# Patient Record
Sex: MALE | Race: BLACK OR AFRICAN AMERICAN | NOT HISPANIC OR LATINO | Employment: OTHER | URBAN - METROPOLITAN AREA
[De-identification: names, ages, dates, MRNs, and addresses within clinical notes are randomized per-mention and may not be internally consistent; named-entity substitution may affect disease eponyms.]

---

## 2023-03-23 DIAGNOSIS — E78.5 HYPERLIPIDEMIA, UNSPECIFIED: ICD-10-CM

## 2023-03-27 DIAGNOSIS — I10 ESSENTIAL (PRIMARY) HYPERTENSION: ICD-10-CM

## 2023-03-27 DIAGNOSIS — E11.9 TYPE 2 DIABETES MELLITUS WITHOUT COMPLICATIONS (MULTI): ICD-10-CM

## 2023-04-05 PROBLEM — J84.10 PULMONARY FIBROSIS (MULTI): Status: ACTIVE | Noted: 2023-04-05

## 2023-04-05 PROBLEM — E11.9 TYPE 2 DIABETES MELLITUS (MULTI): Status: ACTIVE | Noted: 2023-04-05

## 2023-04-05 PROBLEM — J84.9 ILD (INTERSTITIAL LUNG DISEASE) (MULTI): Status: ACTIVE | Noted: 2023-04-05

## 2023-04-05 PROBLEM — E66.01 MORBID OBESITY (MULTI): Status: ACTIVE | Noted: 2023-04-05

## 2023-04-05 PROBLEM — H52.01 AXIAL HYPERMETROPIA OF RIGHT EYE: Status: ACTIVE | Noted: 2023-04-05

## 2023-04-05 PROBLEM — G47.33 OSA (OBSTRUCTIVE SLEEP APNEA): Status: ACTIVE | Noted: 2023-04-05

## 2023-04-05 PROBLEM — R93.89 ABNORMAL CHEST XRAY: Status: ACTIVE | Noted: 2023-04-05

## 2023-04-05 PROBLEM — N52.9 ERECTILE DYSFUNCTION: Status: ACTIVE | Noted: 2023-04-05

## 2023-04-05 PROBLEM — E78.5 DYSLIPIDEMIA: Status: ACTIVE | Noted: 2023-04-05

## 2023-04-05 PROBLEM — K21.9 ESOPHAGEAL REFLUX: Status: ACTIVE | Noted: 2023-04-05

## 2023-04-05 PROBLEM — H52.13 MYOPIA, BILATERAL: Status: ACTIVE | Noted: 2023-04-05

## 2023-04-05 PROBLEM — J98.4 CYSTIC-BULLOUS DISEASE OF LUNG: Status: ACTIVE | Noted: 2023-04-05

## 2023-04-05 PROBLEM — E55.9 VITAMIN D DEFICIENCY: Status: ACTIVE | Noted: 2023-04-05

## 2023-04-05 PROBLEM — H52.03 HYPEROPIA OF BOTH EYES WITH REGULAR ASTIGMATISM: Status: ACTIVE | Noted: 2023-04-05

## 2023-04-05 PROBLEM — I10 ESSENTIAL HYPERTENSION: Status: ACTIVE | Noted: 2023-04-05

## 2023-04-05 PROBLEM — J44.9 CHRONIC OBSTRUCTIVE PULMONARY DISEASE (MULTI): Status: ACTIVE | Noted: 2023-04-05

## 2023-04-05 PROBLEM — D64.9 ANEMIA: Status: ACTIVE | Noted: 2023-04-05

## 2023-04-05 PROBLEM — H52.223 HYPEROPIA OF BOTH EYES WITH REGULAR ASTIGMATISM: Status: ACTIVE | Noted: 2023-04-05

## 2023-04-05 PROBLEM — M17.0 ARTHRITIS OF BOTH KNEES: Status: ACTIVE | Noted: 2023-04-05

## 2023-04-05 PROBLEM — R93.89 ABNORMAL CHEST CT: Status: ACTIVE | Noted: 2023-04-05

## 2023-04-05 PROBLEM — H53.8 BLURRY VISION, BILATERAL: Status: ACTIVE | Noted: 2023-04-05

## 2023-04-05 RX ORDER — METOPROLOL SUCCINATE 50 MG/1
1 TABLET, EXTENDED RELEASE ORAL DAILY
COMMUNITY
Start: 2021-02-12 | End: 2023-04-20 | Stop reason: SDUPTHER

## 2023-04-05 RX ORDER — ATORVASTATIN CALCIUM 20 MG/1
1 TABLET, FILM COATED ORAL DAILY
COMMUNITY
Start: 2022-10-13 | End: 2023-05-02 | Stop reason: SDUPTHER

## 2023-04-05 RX ORDER — TRIAMTERENE/HYDROCHLOROTHIAZID 37.5-25 MG
1 TABLET ORAL DAILY
COMMUNITY
Start: 2018-09-27 | End: 2023-04-20 | Stop reason: SDUPTHER

## 2023-04-05 RX ORDER — AMLODIPINE BESYLATE 10 MG/1
1 TABLET ORAL DAILY
COMMUNITY
Start: 2019-11-05 | End: 2023-04-20 | Stop reason: SDUPTHER

## 2023-04-05 RX ORDER — ALBUTEROL SULFATE 90 UG/1
AEROSOL, METERED RESPIRATORY (INHALATION)
COMMUNITY
Start: 2022-10-13

## 2023-04-05 RX ORDER — METFORMIN HYDROCHLORIDE 500 MG/1
1000 TABLET, EXTENDED RELEASE ORAL
COMMUNITY
Start: 2022-10-13 | End: 2023-05-02 | Stop reason: SDUPTHER

## 2023-04-06 ENCOUNTER — APPOINTMENT (OUTPATIENT)
Dept: PRIMARY CARE | Facility: CLINIC | Age: 38
End: 2023-04-06
Payer: COMMERCIAL

## 2023-04-20 ENCOUNTER — OFFICE VISIT (OUTPATIENT)
Dept: PRIMARY CARE | Facility: CLINIC | Age: 38
End: 2023-04-20
Payer: COMMERCIAL

## 2023-04-20 VITALS
RESPIRATION RATE: 16 BRPM | DIASTOLIC BLOOD PRESSURE: 100 MMHG | HEART RATE: 70 BPM | WEIGHT: 315 LBS | TEMPERATURE: 97.7 F | OXYGEN SATURATION: 97 % | HEIGHT: 75 IN | BODY MASS INDEX: 39.17 KG/M2 | SYSTOLIC BLOOD PRESSURE: 162 MMHG

## 2023-04-20 DIAGNOSIS — E11.9 TYPE 2 DIABETES MELLITUS WITHOUT COMPLICATION, WITHOUT LONG-TERM CURRENT USE OF INSULIN (MULTI): ICD-10-CM

## 2023-04-20 DIAGNOSIS — E78.5 DYSLIPIDEMIA: Primary | ICD-10-CM

## 2023-04-20 DIAGNOSIS — I10 ESSENTIAL HYPERTENSION: ICD-10-CM

## 2023-04-20 PROCEDURE — 99214 OFFICE O/P EST MOD 30 MIN: CPT | Performed by: FAMILY MEDICINE

## 2023-04-20 PROCEDURE — 3077F SYST BP >= 140 MM HG: CPT | Performed by: FAMILY MEDICINE

## 2023-04-20 PROCEDURE — 1036F TOBACCO NON-USER: CPT | Performed by: FAMILY MEDICINE

## 2023-04-20 PROCEDURE — 3080F DIAST BP >= 90 MM HG: CPT | Performed by: FAMILY MEDICINE

## 2023-04-20 RX ORDER — AMLODIPINE BESYLATE 10 MG/1
10 TABLET ORAL DAILY
Qty: 90 TABLET | Refills: 0 | Status: SHIPPED | OUTPATIENT
Start: 2023-04-20 | End: 2023-08-10 | Stop reason: SDUPTHER

## 2023-04-20 RX ORDER — METOPROLOL SUCCINATE 50 MG/1
50 TABLET, EXTENDED RELEASE ORAL DAILY
Qty: 90 TABLET | Refills: 0 | Status: SHIPPED | OUTPATIENT
Start: 2023-04-20 | End: 2023-05-11 | Stop reason: DRUGHIGH

## 2023-04-20 RX ORDER — TRIAMTERENE/HYDROCHLOROTHIAZID 37.5-25 MG
1 TABLET ORAL DAILY
Qty: 90 TABLET | Refills: 0 | Status: SHIPPED | OUTPATIENT
Start: 2023-04-20 | End: 2023-08-10 | Stop reason: SDUPTHER

## 2023-04-20 NOTE — PROGRESS NOTES
"Subjective   Patient ID: Boy Ellington is a 37 y.o. male who presents for Med Refill.    HPI   H/O DM, HLD, HTN.  Did not return for labs or office visit as advised  Ran out of meds.  Requests refills.  No acute complaints at this time.     Review of Systems   All other systems reviewed and are negative.  Objective   BP (!) 162/100   Pulse 70   Temp 36.5 °C (97.7 °F)   Resp 16   Ht 1.905 m (6' 3\")   Wt 146 kg (321 lb 6.4 oz)   SpO2 97%   BMI 40.17 kg/m²   Physical Exam  Constitutional:       General: He is not in acute distress.     Appearance: He is obese.   Eyes:      Conjunctiva/sclera: Conjunctivae normal.   Cardiovascular:      Rate and Rhythm: Normal rate and regular rhythm.      Heart sounds: Normal heart sounds. No murmur heard.     No friction rub. No gallop.   Pulmonary:      Effort: Pulmonary effort is normal.      Breath sounds: No wheezing, rhonchi or rales.   Neurological:      General: No focal deficit present.      Mental Status: He is oriented to person, place, and time.   Psychiatric:         Mood and Affect: Mood normal.         Behavior: Behavior normal.     Assessment/Plan   Diagnoses and all orders for this visit:  Dyslipidemia  -     Lipid Panel; Future  Essential hypertension  -     CBC; Future  -     Basic Metabolic Panel; Future  -     amLODIPine (Norvasc) 10 mg tablet; Take 1 tablet (10 mg) by mouth once daily.  -     metoprolol succinate XL (Toprol-XL) 50 mg 24 hr tablet; Take 1 tablet (50 mg) by mouth once daily.  -     triamterene-hydrochlorothiazid (Maxzide-25) 37.5-25 mg tablet; Take 1 tablet by mouth once daily.  Type 2 diabetes mellitus without complication, without long-term current use of insulin (CMS/[facility])  -     Hemoglobin A1C; Future  -     Albumin , Urine Random; Future    Labs re-ordered.  Refilled blood pressure meds.  Will refill other meds after reviewing lab results.    F/U 2 weeks: BP check, Review lab results, Consider nephrology referral if microalbuminuria still " present.

## 2023-04-20 NOTE — PATIENT INSTRUCTIONS
Labs re-ordered.  Refilled blood pressure meds.  Will refill other meds after reviewing lab results.    F/U 2 weeks: BP check, Review lab results.

## 2023-05-01 ENCOUNTER — LAB (OUTPATIENT)
Dept: LAB | Facility: LAB | Age: 38
End: 2023-05-01
Payer: COMMERCIAL

## 2023-05-01 DIAGNOSIS — E11.9 TYPE 2 DIABETES MELLITUS WITHOUT COMPLICATION, WITHOUT LONG-TERM CURRENT USE OF INSULIN (MULTI): ICD-10-CM

## 2023-05-01 DIAGNOSIS — I10 ESSENTIAL HYPERTENSION: ICD-10-CM

## 2023-05-01 DIAGNOSIS — E78.5 DYSLIPIDEMIA: ICD-10-CM

## 2023-05-01 LAB
AMPHETAMINE (PRESENCE) IN URINE BY SCREEN METHOD: NORMAL
BARBITURATES PRESENCE IN URINE BY SCREEN METHOD: NORMAL
BENZODIAZEPINE (PRESENCE) IN URINE BY SCREEN METHOD: NORMAL
CANNABINOIDS IN URINE BY SCREEN METHOD: NORMAL
COCAINE (PRESENCE) IN URINE BY SCREEN METHOD: NORMAL
DRUG SCREEN COMMENT URINE: NORMAL
FENTANYL URINE: NORMAL
HIV 1/ 2 AG/AB SCREEN: NONREACTIVE
IGG (MG/DL) IN SER/PLAS: 1330 MG/DL (ref 700–1600)
METHADONE (PRESENCE) IN URINE BY SCREEN METHOD: NORMAL
OPIATES (PRESENCE) IN URINE BY SCREEN METHOD: NORMAL
OXYCODONE (PRESENCE) IN URINE BY SCREEN METHOD: NORMAL
PHENCYCLIDINE (PRESENCE) IN URINE BY SCREEN METHOD: NORMAL
SWEAT CHLORIDE, LEFT ARM: NORMAL
SWEAT CHLORIDE, RIGHT ARM: NORMAL

## 2023-05-01 PROCEDURE — 83036 HEMOGLOBIN GLYCOSYLATED A1C: CPT

## 2023-05-01 PROCEDURE — 36415 COLL VENOUS BLD VENIPUNCTURE: CPT

## 2023-05-01 PROCEDURE — 85027 COMPLETE CBC AUTOMATED: CPT

## 2023-05-01 PROCEDURE — 80061 LIPID PANEL: CPT

## 2023-05-01 PROCEDURE — 80048 BASIC METABOLIC PNL TOTAL CA: CPT

## 2023-05-02 DIAGNOSIS — R80.9 TYPE 2 DIABETES MELLITUS WITH MICROALBUMINURIA, WITHOUT LONG-TERM CURRENT USE OF INSULIN (MULTI): Primary | ICD-10-CM

## 2023-05-02 DIAGNOSIS — E11.29 TYPE 2 DIABETES MELLITUS WITH MICROALBUMINURIA, WITHOUT LONG-TERM CURRENT USE OF INSULIN (MULTI): Primary | ICD-10-CM

## 2023-05-02 LAB
ANION GAP IN SER/PLAS: 13 MMOL/L (ref 10–20)
CALCIUM (MG/DL) IN SER/PLAS: 10 MG/DL (ref 8.6–10.6)
CARBON DIOXIDE, TOTAL (MMOL/L) IN SER/PLAS: 31 MMOL/L (ref 21–32)
CHLORIDE (MMOL/L) IN SER/PLAS: 96 MMOL/L (ref 98–107)
CHOLESTEROL (MG/DL) IN SER/PLAS: 137 MG/DL (ref 0–199)
CHOLESTEROL IN HDL (MG/DL) IN SER/PLAS: 42.1 MG/DL
CHOLESTEROL/HDL RATIO: 3.3
CREATININE (MG/DL) IN SER/PLAS: 0.98 MG/DL (ref 0.5–1.3)
ERYTHROCYTE DISTRIBUTION WIDTH (RATIO) BY AUTOMATED COUNT: 11.8 % (ref 11.5–14.5)
ERYTHROCYTE MEAN CORPUSCULAR HEMOGLOBIN CONCENTRATION (G/DL) BY AUTOMATED: 35.2 G/DL (ref 32–36)
ERYTHROCYTE MEAN CORPUSCULAR VOLUME (FL) BY AUTOMATED COUNT: 84 FL (ref 80–100)
ERYTHROCYTES (10*6/UL) IN BLOOD BY AUTOMATED COUNT: 4.69 X10E12/L (ref 4.5–5.9)
ESTIMATED AVERAGE GLUCOSE FOR HBA1C: 303 MG/DL
GFR MALE: >90 ML/MIN/1.73M2
GLUCOSE (MG/DL) IN SER/PLAS: 253 MG/DL (ref 74–99)
HEMATOCRIT (%) IN BLOOD BY AUTOMATED COUNT: 39.2 % (ref 41–52)
HEMOGLOBIN (G/DL) IN BLOOD: 13.8 G/DL (ref 13.5–17.5)
HEMOGLOBIN A1C/HEMOGLOBIN TOTAL IN BLOOD: 12.2 %
LDL: 80 MG/DL (ref 0–99)
LEUKOCYTES (10*3/UL) IN BLOOD BY AUTOMATED COUNT: 11 X10E9/L (ref 4.4–11.3)
NRBC (PER 100 WBCS) BY AUTOMATED COUNT: 0 /100 WBC (ref 0–0)
PLATELETS (10*3/UL) IN BLOOD AUTOMATED COUNT: 283 X10E9/L (ref 150–450)
POTASSIUM (MMOL/L) IN SER/PLAS: 4.1 MMOL/L (ref 3.5–5.3)
SODIUM (MMOL/L) IN SER/PLAS: 136 MMOL/L (ref 136–145)
TRIGLYCERIDE (MG/DL) IN SER/PLAS: 74 MG/DL (ref 0–149)
UREA NITROGEN (MG/DL) IN SER/PLAS: 13 MG/DL (ref 6–23)
VLDL: 15 MG/DL (ref 0–40)

## 2023-05-02 RX ORDER — METFORMIN HYDROCHLORIDE 500 MG/1
1000 TABLET, EXTENDED RELEASE ORAL
Qty: 180 TABLET | Refills: 0 | Status: SHIPPED | OUTPATIENT
Start: 2023-05-02 | End: 2023-08-13 | Stop reason: SDUPTHER

## 2023-05-02 RX ORDER — ATORVASTATIN CALCIUM 20 MG/1
20 TABLET, FILM COATED ORAL DAILY
Qty: 90 TABLET | Refills: 0 | Status: SHIPPED | OUTPATIENT
Start: 2023-05-02 | End: 2023-08-10 | Stop reason: SDUPTHER

## 2023-05-03 PROBLEM — D72.829 LEUKOCYTOSIS: Status: ACTIVE | Noted: 2023-05-03

## 2023-05-03 PROBLEM — E87.5 HYPERKALEMIA: Status: ACTIVE | Noted: 2023-05-03

## 2023-05-03 RX ORDER — IBUPROFEN 600 MG/1
600 TABLET ORAL EVERY 8 HOURS
COMMUNITY
Start: 2017-05-29 | End: 2023-08-10 | Stop reason: ALTCHOICE

## 2023-05-03 RX ORDER — ACETAMINOPHEN 500 MG/1
500 CAPSULE, LIQUID FILLED ORAL EVERY 4 HOURS PRN
COMMUNITY
Start: 2013-08-07 | End: 2023-11-16 | Stop reason: ALTCHOICE

## 2023-05-03 RX ORDER — DESONIDE 0.5 MG/G
OINTMENT TOPICAL 2 TIMES DAILY
COMMUNITY
Start: 2023-01-26 | End: 2023-11-16 | Stop reason: ALTCHOICE

## 2023-05-04 ENCOUNTER — APPOINTMENT (OUTPATIENT)
Dept: PRIMARY CARE | Facility: CLINIC | Age: 38
End: 2023-05-04
Payer: COMMERCIAL

## 2023-05-05 RX ORDER — ATORVASTATIN CALCIUM 20 MG/1
TABLET, FILM COATED ORAL
Qty: 30 TABLET | Refills: 2 | OUTPATIENT
Start: 2023-05-05

## 2023-05-05 RX ORDER — AMLODIPINE BESYLATE 10 MG/1
TABLET ORAL
Qty: 30 TABLET | OUTPATIENT
Start: 2023-05-05

## 2023-05-05 RX ORDER — METFORMIN HYDROCHLORIDE 500 MG/1
TABLET, EXTENDED RELEASE ORAL
Qty: 60 TABLET | OUTPATIENT
Start: 2023-05-05

## 2023-05-05 RX ORDER — METOPROLOL SUCCINATE 50 MG/1
TABLET, EXTENDED RELEASE ORAL
Qty: 30 TABLET | OUTPATIENT
Start: 2023-05-05

## 2023-05-05 RX ORDER — TRIAMTERENE/HYDROCHLOROTHIAZID 37.5-25 MG
TABLET ORAL
Qty: 30 TABLET | OUTPATIENT
Start: 2023-05-05

## 2023-05-08 LAB — Lab: 37 PG/ML (ref 9–86)

## 2023-05-11 ENCOUNTER — OFFICE VISIT (OUTPATIENT)
Dept: PRIMARY CARE | Facility: CLINIC | Age: 38
End: 2023-05-11
Payer: COMMERCIAL

## 2023-05-11 VITALS
WEIGHT: 313.6 LBS | BODY MASS INDEX: 38.99 KG/M2 | OXYGEN SATURATION: 96 % | HEIGHT: 75 IN | RESPIRATION RATE: 16 BRPM | SYSTOLIC BLOOD PRESSURE: 152 MMHG | DIASTOLIC BLOOD PRESSURE: 90 MMHG | HEART RATE: 73 BPM

## 2023-05-11 DIAGNOSIS — E11.29 TYPE 2 DIABETES MELLITUS WITH MICROALBUMINURIA, WITHOUT LONG-TERM CURRENT USE OF INSULIN (MULTI): ICD-10-CM

## 2023-05-11 DIAGNOSIS — E78.5 DYSLIPIDEMIA: ICD-10-CM

## 2023-05-11 DIAGNOSIS — I10 PRIMARY HYPERTENSION: Primary | ICD-10-CM

## 2023-05-11 DIAGNOSIS — R80.9 TYPE 2 DIABETES MELLITUS WITH MICROALBUMINURIA, WITHOUT LONG-TERM CURRENT USE OF INSULIN (MULTI): ICD-10-CM

## 2023-05-11 DIAGNOSIS — I10 ESSENTIAL HYPERTENSION: ICD-10-CM

## 2023-05-11 PROCEDURE — 1036F TOBACCO NON-USER: CPT | Performed by: FAMILY MEDICINE

## 2023-05-11 PROCEDURE — 3046F HEMOGLOBIN A1C LEVEL >9.0%: CPT | Performed by: FAMILY MEDICINE

## 2023-05-11 PROCEDURE — 3077F SYST BP >= 140 MM HG: CPT | Performed by: FAMILY MEDICINE

## 2023-05-11 PROCEDURE — 99214 OFFICE O/P EST MOD 30 MIN: CPT | Performed by: FAMILY MEDICINE

## 2023-05-11 PROCEDURE — 3080F DIAST BP >= 90 MM HG: CPT | Performed by: FAMILY MEDICINE

## 2023-05-11 RX ORDER — METOPROLOL SUCCINATE 50 MG/1
100 TABLET, EXTENDED RELEASE ORAL DAILY
Qty: 1 TABLET | Refills: 0 | Status: SHIPPED | OUTPATIENT
Start: 2023-05-11 | End: 2023-05-19 | Stop reason: SDUPTHER

## 2023-05-11 NOTE — PROGRESS NOTES
"Subjective   Patient ID: Boy Ellington is a 37 y.o. male who presents for 2 week f/u.    HPI   H/O HTN.  BP elevated at last visit (not taking meds at that time).  Here for BP check.  Taking med(s) as directed since last visit.  Does not need refills at this time.    Labs ordered at last visit.  HbA1c, Lipids not at goal  (not taking meds at that time).  Meds restarted.  Does not need refills at this time.    Of note:  Did not get urine microabumin as ordered.    Review of Systems   All other systems reviewed and are negative.  Objective   /90   Pulse 73   Resp 16   Ht 1.905 m (6' 3\")   Wt 142 kg (313 lb 9.6 oz)   SpO2 96%   BMI 39.20 kg/m²   Physical Exam  Constitutional:       General: He is not in acute distress.     Appearance: He is obese.   Eyes:      Conjunctiva/sclera: Conjunctivae normal.   Cardiovascular:      Rate and Rhythm: Normal rate and regular rhythm.      Heart sounds: Normal heart sounds. No murmur heard.     No friction rub. No gallop.   Pulmonary:      Effort: Pulmonary effort is normal.      Breath sounds: Normal breath sounds. No wheezing, rhonchi or rales.   Skin:     Coloration: Skin is not jaundiced or pale.   Neurological:      Mental Status: He is oriented to person, place, and time.   Psychiatric:         Mood and Affect: Mood normal.         Behavior: Behavior normal.     Assessment/Plan   Diagnoses and all orders for this visit:  Primary hypertension  Type 2 diabetes mellitus with microalbuminuria, without long-term current use of insulin (CMS/[facility])  Dyslipidemia  Essential hypertension  -     metoprolol succinate XL (Toprol-XL) 50 mg 24 hr tablet; Take 2 tablets (100 mg) by mouth once daily.    Have urine microalbumin done as previously ordered (will consider nephrology referral if microalbuminuria still present).    Increase Metoprolol to 2 tabs each morning.  Continue other current medications.    Hypertension recommendations:  DASH diet.  Decrease sodium intake to <1,500 " mg/day.  Recommend weight loss efforts (see www.[email or web address]/category/nutrition for ideas).  Recommend exercising (brisk walking, jogging, swimming, bicycling) 30 minutes/day, 5 days/week.  Stop smoking (if applicable).  Call Tobacco Quit Line (1-800-QUIT-NOW) for resources and support.  Decrease alcohol intake (if applicable).     F/U 2-3 weeks: BP check.

## 2023-05-11 NOTE — PATIENT INSTRUCTIONS
Have urine microalbumin done as previously ordered (will consider nephrology referral if microalbuminuria still present).    Increase Metoprolol to 2 tabs each morning.  Continue other current medications.    Hypertension recommendations:  DASH diet.  Decrease sodium intake to <1,500 mg/day.  Recommend weight loss efforts (see www.[email or web address]/category/nutrition for ideas).  Recommend exercising (brisk walking, jogging, swimming, bicycling) 30 minutes/day, 5 days/week.  Stop smoking (if applicable).  Call Tobacco Quit Line (8-649-QUIT-NOW) for resources and support.  Decrease alcohol intake (if applicable).     F/U 2-3 weeks: BP check.

## 2023-05-19 DIAGNOSIS — I10 ESSENTIAL HYPERTENSION: ICD-10-CM

## 2023-05-19 RX ORDER — METOPROLOL SUCCINATE 100 MG/1
100 TABLET, EXTENDED RELEASE ORAL DAILY
Qty: 60 TABLET | Refills: 0 | Status: SHIPPED | OUTPATIENT
Start: 2023-05-19 | End: 2023-06-09 | Stop reason: SDUPTHER

## 2023-06-01 ENCOUNTER — LAB (OUTPATIENT)
Dept: LAB | Facility: LAB | Age: 38
End: 2023-06-01
Payer: COMMERCIAL

## 2023-06-01 LAB
ALBUMIN (MG/L) IN URINE: 25.6 MG/L
ALBUMIN/CREATININE (UG/MG) IN URINE: 21.3 UG/MG CRT (ref 0–30)
CREATININE (MG/DL) IN URINE: 120 MG/DL (ref 20–370)

## 2023-06-01 PROCEDURE — 82043 UR ALBUMIN QUANTITATIVE: CPT

## 2023-06-01 PROCEDURE — 82570 ASSAY OF URINE CREATININE: CPT

## 2023-06-06 LAB
ALBUMIN (MG/L) IN URINE: NORMAL MG/L
ALBUMIN/CREATININE (UG/MG) IN URINE: NORMAL UG/MG CRT (ref 0–30)
CREATININE (MG/DL) IN URINE: NORMAL MG/DL (ref 20–370)

## 2023-06-09 ENCOUNTER — OFFICE VISIT (OUTPATIENT)
Dept: PRIMARY CARE | Facility: CLINIC | Age: 38
End: 2023-06-09
Payer: COMMERCIAL

## 2023-06-09 VITALS
RESPIRATION RATE: 16 BRPM | OXYGEN SATURATION: 97 % | HEIGHT: 75 IN | SYSTOLIC BLOOD PRESSURE: 144 MMHG | WEIGHT: 305 LBS | BODY MASS INDEX: 37.92 KG/M2 | DIASTOLIC BLOOD PRESSURE: 80 MMHG | HEART RATE: 70 BPM

## 2023-06-09 DIAGNOSIS — E11.9 TYPE 2 DIABETES MELLITUS WITHOUT COMPLICATION, WITHOUT LONG-TERM CURRENT USE OF INSULIN (MULTI): ICD-10-CM

## 2023-06-09 DIAGNOSIS — I10 ESSENTIAL HYPERTENSION: Primary | ICD-10-CM

## 2023-06-09 PROCEDURE — 3046F HEMOGLOBIN A1C LEVEL >9.0%: CPT | Performed by: FAMILY MEDICINE

## 2023-06-09 PROCEDURE — 99214 OFFICE O/P EST MOD 30 MIN: CPT | Performed by: FAMILY MEDICINE

## 2023-06-09 PROCEDURE — 1036F TOBACCO NON-USER: CPT | Performed by: FAMILY MEDICINE

## 2023-06-09 PROCEDURE — 3077F SYST BP >= 140 MM HG: CPT | Performed by: FAMILY MEDICINE

## 2023-06-09 PROCEDURE — 3079F DIAST BP 80-89 MM HG: CPT | Performed by: FAMILY MEDICINE

## 2023-06-09 RX ORDER — METOPROLOL SUCCINATE 100 MG/1
100 TABLET, EXTENDED RELEASE ORAL DAILY
Qty: 60 TABLET | Refills: 0 | Status: SHIPPED | OUTPATIENT
Start: 2023-06-09 | End: 2023-08-10 | Stop reason: SDUPTHER

## 2023-06-09 NOTE — PATIENT INSTRUCTIONS
Reviewed lab results (urine microalbumin).  Printed HbA1c and urine microalbumin results as requested.    Refilled Metoprolol (1 TAB EACH DAY).  Take all other meds as directed.    Hypertension recommendations:  DASH diet.  Decrease sodium intake to <1,500 mg/day.  Recommend weight loss efforts (see www.[email or web address]/category/nutrition for ideas).  Recommend exercising (brisk walking, jogging, swimming, bicycling) 30 minutes/day, 5 days/week.  Stop smoking (if applicable).  Call Tobacco Quit Line (1-858-QUIT-NOW) for resources and support.  Decrease alcohol intake (if applicable).     F/U 2 months: Med refills, Labs.

## 2023-06-09 NOTE — PROGRESS NOTES
"Subjective   Patient ID: Boy Ellington is a 38 y.o. male who presents for discuss DM.    HPI   H/O HTN.  Metoprolol increased at last visit (refill subsequently sent).  Here for BP check.    States he was erroneously taking 2 tabs/day instead of 1 tab/day when he received the refill.  Has therefore been out of Metoprolol x 3 days.  Requests refill.  Does not need refill of any other meds at this time.    Requests copy of last HbA1c and urine microalbumin results.    Review of Systems   All other systems reviewed and are negative.    Objective   /80   Pulse 70   Resp 16   Ht 1.905 m (6' 3\")   Wt 138 kg (305 lb)   SpO2 97%   BMI 38.12 kg/m²     Physical Exam  Constitutional:       General: He is not in acute distress.     Appearance: He is obese.   Eyes:      Conjunctiva/sclera: Conjunctivae normal.   Cardiovascular:      Rate and Rhythm: Normal rate and regular rhythm.      Heart sounds: Normal heart sounds. No murmur heard.     No friction rub. No gallop.   Pulmonary:      Effort: Pulmonary effort is normal.      Breath sounds: Normal breath sounds. No wheezing, rhonchi or rales.   Skin:     Coloration: Skin is not jaundiced or pale.   Neurological:      Mental Status: He is oriented to person, place, and time.   Psychiatric:         Mood and Affect: Mood normal.         Behavior: Behavior normal.     Assessment/Plan   Diagnoses and all orders for this visit:  Essential hypertension  -     metoprolol succinate XL (Toprol-XL) 100 mg 24 hr tablet; Take 1 tablet (100 mg) by mouth once daily.  Type 2 diabetes mellitus without complication, without long-term current use of insulin (CMS/[facility])    Reviewed lab results (urine microalbumin).  Printed HbA1c and urine microalbumin results as requested.    Refilled Metoprolol (1 TAB EACH DAY).  Take all other meds as directed.    Hypertension recommendations:  DASH diet.  Decrease sodium intake to <1,500 mg/day.  Recommend weight loss efforts (see " www.[email or web address]/category/nutrition for ideas).  Recommend exercising (brisk walking, jogging, swimming, bicycling) 30 minutes/day, 5 days/week.  Stop smoking (if applicable).  Call Tobacco Quit Line (1-800-QUIT-NOW) for resources and support.  Decrease alcohol intake (if applicable).     F/U 2 months: Med refills, Labs.

## 2023-08-10 ENCOUNTER — LAB (OUTPATIENT)
Dept: LAB | Facility: LAB | Age: 38
End: 2023-08-10
Payer: COMMERCIAL

## 2023-08-10 ENCOUNTER — OFFICE VISIT (OUTPATIENT)
Dept: PRIMARY CARE | Facility: CLINIC | Age: 38
End: 2023-08-10
Payer: COMMERCIAL

## 2023-08-10 VITALS
BODY MASS INDEX: 38.87 KG/M2 | HEART RATE: 59 BPM | DIASTOLIC BLOOD PRESSURE: 105 MMHG | SYSTOLIC BLOOD PRESSURE: 163 MMHG | OXYGEN SATURATION: 96 % | HEIGHT: 75 IN | RESPIRATION RATE: 16 BRPM | WEIGHT: 312.6 LBS

## 2023-08-10 DIAGNOSIS — I10 PRIMARY HYPERTENSION: ICD-10-CM

## 2023-08-10 DIAGNOSIS — E11.9 TYPE 2 DIABETES MELLITUS WITHOUT COMPLICATION, WITHOUT LONG-TERM CURRENT USE OF INSULIN (MULTI): ICD-10-CM

## 2023-08-10 DIAGNOSIS — E11.9 TYPE 2 DIABETES MELLITUS WITHOUT COMPLICATION, WITHOUT LONG-TERM CURRENT USE OF INSULIN (MULTI): Primary | ICD-10-CM

## 2023-08-10 DIAGNOSIS — E78.5 DYSLIPIDEMIA: ICD-10-CM

## 2023-08-10 LAB
ANION GAP IN SER/PLAS: 13 MMOL/L (ref 10–20)
CALCIUM (MG/DL) IN SER/PLAS: 10.1 MG/DL (ref 8.6–10.6)
CARBON DIOXIDE, TOTAL (MMOL/L) IN SER/PLAS: 30 MMOL/L (ref 21–32)
CHLORIDE (MMOL/L) IN SER/PLAS: 101 MMOL/L (ref 98–107)
CREATININE (MG/DL) IN SER/PLAS: 0.94 MG/DL (ref 0.5–1.3)
ESTIMATED AVERAGE GLUCOSE FOR HBA1C: 209 MG/DL
GFR MALE: >90 ML/MIN/1.73M2
GLUCOSE (MG/DL) IN SER/PLAS: 138 MG/DL (ref 74–99)
HEMOGLOBIN A1C/HEMOGLOBIN TOTAL IN BLOOD: 8.9 %
POTASSIUM (MMOL/L) IN SER/PLAS: 4 MMOL/L (ref 3.5–5.3)
SODIUM (MMOL/L) IN SER/PLAS: 140 MMOL/L (ref 136–145)
UREA NITROGEN (MG/DL) IN SER/PLAS: 16 MG/DL (ref 6–23)

## 2023-08-10 PROCEDURE — 36415 COLL VENOUS BLD VENIPUNCTURE: CPT

## 2023-08-10 PROCEDURE — 99214 OFFICE O/P EST MOD 30 MIN: CPT | Performed by: FAMILY MEDICINE

## 2023-08-10 PROCEDURE — 3077F SYST BP >= 140 MM HG: CPT | Performed by: FAMILY MEDICINE

## 2023-08-10 PROCEDURE — 80048 BASIC METABOLIC PNL TOTAL CA: CPT

## 2023-08-10 PROCEDURE — 1036F TOBACCO NON-USER: CPT | Performed by: FAMILY MEDICINE

## 2023-08-10 PROCEDURE — 3080F DIAST BP >= 90 MM HG: CPT | Performed by: FAMILY MEDICINE

## 2023-08-10 PROCEDURE — 3052F HG A1C>EQUAL 8.0%<EQUAL 9.0%: CPT | Performed by: FAMILY MEDICINE

## 2023-08-10 PROCEDURE — 83036 HEMOGLOBIN GLYCOSYLATED A1C: CPT

## 2023-08-10 RX ORDER — TRIAMTERENE/HYDROCHLOROTHIAZID 37.5-25 MG
1 TABLET ORAL DAILY
Qty: 90 TABLET | Refills: 0 | Status: SHIPPED | OUTPATIENT
Start: 2023-08-10 | End: 2024-02-22 | Stop reason: SDUPTHER

## 2023-08-10 RX ORDER — METOPROLOL SUCCINATE 100 MG/1
100 TABLET, EXTENDED RELEASE ORAL DAILY
Qty: 90 TABLET | Refills: 0 | Status: SHIPPED | OUTPATIENT
Start: 2023-08-10 | End: 2023-12-29 | Stop reason: SDUPTHER

## 2023-08-10 RX ORDER — AMLODIPINE BESYLATE 10 MG/1
10 TABLET ORAL DAILY
Qty: 90 TABLET | Refills: 0 | Status: SHIPPED | OUTPATIENT
Start: 2023-08-10 | End: 2023-12-14 | Stop reason: SDUPTHER

## 2023-08-10 RX ORDER — ATORVASTATIN CALCIUM 20 MG/1
20 TABLET, FILM COATED ORAL DAILY
Qty: 90 TABLET | Refills: 0 | Status: SHIPPED | OUTPATIENT
Start: 2023-08-10 | End: 2024-04-18 | Stop reason: SDUPTHER

## 2023-08-10 ASSESSMENT — ENCOUNTER SYMPTOMS
LIGHT-HEADEDNESS: 0
CHEST TIGHTNESS: 0
SHORTNESS OF BREATH: 0
PALPITATIONS: 0
DIZZINESS: 0

## 2023-08-10 NOTE — PROGRESS NOTES
"Subjective   Patient ID: Boy Ellington is a 38 y.o. male who presents for Med Refill.    HPI   H/O DM, HLD, HTN.  Out of Norvasc and Maxzide.  States he is taking other meds as directed.  Requests all med refills.    Review of Systems   Respiratory:  Negative for chest tightness and shortness of breath.    Cardiovascular:  Negative for chest pain and palpitations.   Neurological:  Negative for dizziness and light-headedness.     Objective   BP (!) 163/105   Pulse 59   Resp 16   Ht 1.905 m (6' 3\")   Wt 142 kg (312 lb 9.6 oz)   SpO2 96%   BMI 39.07 kg/m²     Physical Exam  Constitutional:       Appearance: He is obese.   Eyes:      Conjunctiva/sclera: Conjunctivae normal.   Cardiovascular:      Rate and Rhythm: Regular rhythm. Bradycardia present.      Heart sounds: Normal heart sounds. No murmur heard.     No friction rub. No gallop.   Pulmonary:      Effort: Pulmonary effort is normal.      Breath sounds: Normal breath sounds. No wheezing, rhonchi or rales.   Skin:     Coloration: Skin is not jaundiced or pale.   Neurological:      General: No focal deficit present.      Mental Status: He is oriented to person, place, and time.   Psychiatric:         Mood and Affect: Mood normal.         Behavior: Behavior normal.     Assessment/Plan   Diagnoses and all orders for this visit:  Type 2 diabetes mellitus without complication, without long-term current use of insulin (CMS/[facility])  -     Hemoglobin A1C; Future  Dyslipidemia  -     atorvastatin (Lipitor) 20 mg tablet; Take 1 tablet (20 mg) by mouth once daily.  Primary hypertension  -     Basic Metabolic Panel; Future  -     amLODIPine (Norvasc) 10 mg tablet; Take 1 tablet (10 mg) by mouth once daily.  -     metoprolol succinate XL (Toprol-XL) 100 mg 24 hr tablet; Take 1 tablet (100 mg) by mouth once daily.  -     triamterene-hydrochlorothiazid (Maxzide-25) 37.5-25 mg tablet; Take 1 tablet by mouth once daily.    Nonfasting labs.  Refilled meds (except " Metformin).  Will refill Metformin after reviewing lab results.    F/U 1-2 weeks: BP check.

## 2023-08-10 NOTE — PATIENT INSTRUCTIONS
Nonfasting labs.  Refilled meds (except Metformin).  Will refill Metformin after reviewing lab results.    F/U 1-2 weeks: BP check.

## 2023-08-13 DIAGNOSIS — E11.29 TYPE 2 DIABETES MELLITUS WITH MICROALBUMINURIA, WITHOUT LONG-TERM CURRENT USE OF INSULIN (MULTI): ICD-10-CM

## 2023-08-13 DIAGNOSIS — R80.9 TYPE 2 DIABETES MELLITUS WITH MICROALBUMINURIA, WITHOUT LONG-TERM CURRENT USE OF INSULIN (MULTI): ICD-10-CM

## 2023-08-13 RX ORDER — METFORMIN HYDROCHLORIDE 500 MG/1
2000 TABLET, EXTENDED RELEASE ORAL
Qty: 360 TABLET | Refills: 0 | Status: SHIPPED | OUTPATIENT
Start: 2023-08-13 | End: 2024-04-18 | Stop reason: SDUPTHER

## 2023-08-24 ENCOUNTER — OFFICE VISIT (OUTPATIENT)
Dept: PRIMARY CARE | Facility: CLINIC | Age: 38
End: 2023-08-24
Payer: COMMERCIAL

## 2023-08-24 VITALS
DIASTOLIC BLOOD PRESSURE: 80 MMHG | SYSTOLIC BLOOD PRESSURE: 136 MMHG | HEART RATE: 68 BPM | HEIGHT: 75 IN | OXYGEN SATURATION: 98 % | WEIGHT: 311.6 LBS | RESPIRATION RATE: 16 BRPM | BODY MASS INDEX: 38.74 KG/M2

## 2023-08-24 DIAGNOSIS — I10 PRIMARY HYPERTENSION: Primary | ICD-10-CM

## 2023-08-24 PROCEDURE — 99213 OFFICE O/P EST LOW 20 MIN: CPT | Performed by: FAMILY MEDICINE

## 2023-08-24 PROCEDURE — 3079F DIAST BP 80-89 MM HG: CPT | Performed by: FAMILY MEDICINE

## 2023-08-24 PROCEDURE — 1036F TOBACCO NON-USER: CPT | Performed by: FAMILY MEDICINE

## 2023-08-24 PROCEDURE — 3075F SYST BP GE 130 - 139MM HG: CPT | Performed by: FAMILY MEDICINE

## 2023-08-24 PROCEDURE — 3052F HG A1C>EQUAL 8.0%<EQUAL 9.0%: CPT | Performed by: FAMILY MEDICINE

## 2023-08-24 NOTE — PROGRESS NOTES
"Subjective   Patient ID: Boy Ellington is a 38 y.o. male who presents for Hypertension (Bp check follow up/).    HPI  H/O HTN.  BP elevated at last visit [facility]  Here for BP check.  Taking med(s) as directed since last visit.  Does not need refills at this time.    Review of Systems  No other complaints.     Objective   /80   Pulse 68   Resp 16   Ht 1.905 m (6' 3\")   Wt 141 kg (311 lb 9.6 oz)   SpO2 98%   BMI 38.95 kg/m²     Physical Exam  Constitutional:       General: He is not in acute distress.     Appearance: He is obese.   Eyes:      Conjunctiva/sclera: Conjunctivae normal.   Cardiovascular:      Rate and Rhythm: Normal rate and regular rhythm.      Heart sounds: Normal heart sounds. No murmur heard.     No friction rub. No gallop.   Pulmonary:      Effort: Pulmonary effort is normal.      Breath sounds: Normal breath sounds. No wheezing, rhonchi or rales.   Skin:     Coloration: Skin is not jaundiced or pale.   Neurological:      Mental Status: He is oriented to person, place, and time.   Psychiatric:         Mood and Affect: Mood normal.         Behavior: Behavior normal.     Assessment/Plan   Diagnoses and all orders for this visit:  Primary hypertension    Blood pressure improved.  Continue current medications.    Hypertension recommendations:  DASH diet.  Decrease sodium intake to <1,500 mg/day.  Recommend weight loss efforts (see www.[email or web address]/category/nutrition for ideas).  Recommend exercising (brisk walking, jogging, swimming, bicycling) 30 minutes/day, 5 days/week.  Stop smoking (if applicable).  Call Tobacco Quit Line (4-701-QUIT-NOW) for resources and support.  Decrease alcohol intake (if applicable).    F/U 3 months: Annual wellness visit.  "

## 2023-08-24 NOTE — PATIENT INSTRUCTIONS
Blood pressure improved.  Continue current medications.    Hypertension recommendations:  DASH diet.  Decrease sodium intake to <1,500 mg/day.  Recommend weight loss efforts (see www.[email or web address]/category/nutrition for ideas).  Recommend exercising (brisk walking, jogging, swimming, bicycling) 30 minutes/day, 5 days/week.  Stop smoking (if applicable).  Call Tobacco Quit Line (5-800-QUIT-NOW) for resources and support.  Decrease alcohol intake (if applicable).      F/U 3 months: Annual wellness visit.

## 2023-11-16 ENCOUNTER — OFFICE VISIT (OUTPATIENT)
Dept: PRIMARY CARE | Facility: CLINIC | Age: 38
End: 2023-11-16
Payer: COMMERCIAL

## 2023-11-16 VITALS
OXYGEN SATURATION: 98 % | RESPIRATION RATE: 16 BRPM | WEIGHT: 315 LBS | SYSTOLIC BLOOD PRESSURE: 158 MMHG | BODY MASS INDEX: 39.17 KG/M2 | HEART RATE: 65 BPM | DIASTOLIC BLOOD PRESSURE: 100 MMHG | HEIGHT: 75 IN

## 2023-11-16 DIAGNOSIS — I10 ESSENTIAL HYPERTENSION: ICD-10-CM

## 2023-11-16 DIAGNOSIS — Z00.00 ANNUAL PHYSICAL EXAM: Primary | ICD-10-CM

## 2023-11-16 DIAGNOSIS — E11.9 TYPE 2 DIABETES MELLITUS WITHOUT COMPLICATION, WITHOUT LONG-TERM CURRENT USE OF INSULIN (MULTI): ICD-10-CM

## 2023-11-16 DIAGNOSIS — J44.9 CHRONIC OBSTRUCTIVE PULMONARY DISEASE, UNSPECIFIED COPD TYPE (MULTI): ICD-10-CM

## 2023-11-16 DIAGNOSIS — E66.01 CLASS 2 SEVERE OBESITY DUE TO EXCESS CALORIES WITH SERIOUS COMORBIDITY AND BODY MASS INDEX (BMI) OF 39.0 TO 39.9 IN ADULT (MULTI): ICD-10-CM

## 2023-11-16 DIAGNOSIS — E78.5 DYSLIPIDEMIA: ICD-10-CM

## 2023-11-16 DIAGNOSIS — J84.9 ILD (INTERSTITIAL LUNG DISEASE) (MULTI): ICD-10-CM

## 2023-11-16 DIAGNOSIS — Z11.59 NEED FOR HEPATITIS C SCREENING TEST: ICD-10-CM

## 2023-11-16 DIAGNOSIS — Z12.5 PROSTATE CANCER SCREENING: ICD-10-CM

## 2023-11-16 DIAGNOSIS — J84.10 PULMONARY FIBROSIS (MULTI): ICD-10-CM

## 2023-11-16 DIAGNOSIS — D64.9 ANEMIA, UNSPECIFIED TYPE: ICD-10-CM

## 2023-11-16 PROBLEM — D72.829 LEUKOCYTOSIS: Status: RESOLVED | Noted: 2023-05-03 | Resolved: 2023-11-16

## 2023-11-16 PROBLEM — R93.89 ABNORMAL CHEST XRAY: Status: RESOLVED | Noted: 2023-04-05 | Resolved: 2023-11-16

## 2023-11-16 PROBLEM — E87.5 HYPERKALEMIA: Status: RESOLVED | Noted: 2023-05-03 | Resolved: 2023-11-16

## 2023-11-16 PROCEDURE — 1036F TOBACCO NON-USER: CPT | Performed by: FAMILY MEDICINE

## 2023-11-16 PROCEDURE — 3080F DIAST BP >= 90 MM HG: CPT | Performed by: FAMILY MEDICINE

## 2023-11-16 PROCEDURE — 99396 PREV VISIT EST AGE 40-64: CPT | Performed by: FAMILY MEDICINE

## 2023-11-16 PROCEDURE — 3052F HG A1C>EQUAL 8.0%<EQUAL 9.0%: CPT | Performed by: FAMILY MEDICINE

## 2023-11-16 PROCEDURE — 3077F SYST BP >= 140 MM HG: CPT | Performed by: FAMILY MEDICINE

## 2023-11-16 PROCEDURE — 3008F BODY MASS INDEX DOCD: CPT | Performed by: FAMILY MEDICINE

## 2023-11-16 ASSESSMENT — PATIENT HEALTH QUESTIONNAIRE - PHQ9
SUM OF ALL RESPONSES TO PHQ9 QUESTIONS 1 AND 2: 0
1. LITTLE INTEREST OR PLEASURE IN DOING THINGS: NOT AT ALL
2. FEELING DOWN, DEPRESSED OR HOPELESS: NOT AT ALL

## 2023-11-16 NOTE — PROGRESS NOTES
"Subjective   Patient ID: Boy Ellington is a 38 y.o. male who presents for Annual Exam.    HPI   Patient's health is described as fair.  Regular dental visits: Yes.  Dental hygiene (brushing/flossing) regularly performed: Yes.  Corrective lenses: No.  Vision problems: Yes (occ blurry).  Last eye exam within 1 year: Yes.  Hearing loss: Yes.  Requests audiology referral: No.  Immunizations up to date: No (declined PCV20).  Healthy diet: Fair.  Regular exercise: No (job is physically demanding).  Trying to lose weight: Yes.  Requests nutrition/weight loss referral: No.  Sexually active: Yes.  Using contraception: No.  Requests STD screening: No.  Colon cancer screening up to date: N/A.  Lung cancer screening up to date: N/A.  Hepatitis C screening up to date: No.    H/O DM, HLD, HTN.  Taking med(s) as directed.  States he does not need med refills at this time.    Review of Systems  No other complaints.     Objective   BP (!) 158/100   Pulse 65   Resp 16   Ht 1.905 m (6' 3\")   Wt 144 kg (318 lb)   SpO2 98%   BMI 39.75 kg/m²     Physical Exam  Constitutional:       General: He is not in acute distress.     Appearance: He is obese.   HENT:      Head: Normocephalic.      Right Ear: Tympanic membrane normal.      Left Ear: Tympanic membrane normal.      Mouth/Throat:      Pharynx: Oropharynx is clear. No oropharyngeal exudate or posterior oropharyngeal erythema.   Eyes:      Extraocular Movements: Extraocular movements intact.      Conjunctiva/sclera: Conjunctivae normal.      Pupils: Pupils are equal, round, and reactive to light.   Neck:      Thyroid: No thyromegaly.      Vascular: No carotid bruit.   Cardiovascular:      Rate and Rhythm: Normal rate and regular rhythm.      Pulses: Normal pulses.      Heart sounds: Normal heart sounds. No murmur heard.     No friction rub. No gallop.   Pulmonary:      Effort: Pulmonary effort is normal.      Breath sounds: Normal breath sounds. No wheezing, rhonchi or rales. "   Abdominal:      General: Bowel sounds are normal. There is no distension.      Palpations: Abdomen is soft. There is no mass.      Tenderness: There is no abdominal tenderness. There is no guarding or rebound.   Genitourinary:     Comments: Declined prostate exam  Lymphadenopathy:      Cervical: No cervical adenopathy.   Skin:     Coloration: Skin is not jaundiced or pale.   Neurological:      General: No focal deficit present.      Mental Status: He is oriented to person, place, and time.   Psychiatric:         Mood and Affect: Mood normal.         Behavior: Behavior normal.     Assessment/Plan   Diagnoses and all orders for this visit:  Annual physical exam  Type 2 diabetes mellitus without complication, without long-term current use of insulin (CMS/[facility])  -     Vitamin B12; Future  -     Basic Metabolic Panel; Future  -     Hemoglobin A1C; Future  -     Referral to Ophthalmology; Future  -     Referral to Podiatry; Future  Essential hypertension  -     Basic Metabolic Panel; Future  Dyslipidemia  -     Lipid Panel; Future  -     Aspartate Aminotransferase; Future  -     Alanine Aminotransferase; Future  Anemia, unspecified type  -     CBC; Future  -     Ferritin; Future  -     Folate; Future  -     Iron and TIBC; Future  -     Transferrin; Future  -     Vitamin B12; Future  -     Basic Metabolic Panel; Future  Prostate cancer screening  -     Prostate Specific Antigen, Screen; Future  Need for hepatitis C screening test  -     Hepatitis C Antibody; Future  Chronic obstructive pulmonary disease, unspecified COPD type (CMS/[facility])        -     Tx by pulm  ILD (interstitial lung disease) (CMS/[facility])        -     Tx by pulm  Pulmonary fibrosis (CMS/[facility])        -     Tx by pulm  Class 2 severe obesity due to excess calories with serious comorbidity and body mass index (BMI) of 39.0 to 39.9 in adult (CMS/[facility])    Fasting labs.  Continue current medications.  Referred to ophthalmology (eye exam) and podiatry (foot  exam).  Recommend weight loss efforts (see www.[email or web address]/category/nutrition for ideas).    Hypertension recommendations:  DASH diet.  Decrease sodium intake to <1,500 mg/day.  Recommend weight loss efforts (see www.[email or web address]/category/nutrition for ideas).  Recommend exercising (brisk walking, jogging, swimming, bicycling) 30 minutes/day, 5 days/week.  Stop smoking (if applicable).  Call Tobacco Quit Line (0-927-QUIT-NOW) for resources and support.  Decrease alcohol intake (if applicable).    F/U 2-3 weeks: BP check.

## 2023-11-16 NOTE — PATIENT INSTRUCTIONS
Fasting labs.  Continue current medications.  Recommend weight loss efforts (see www.[email or web address]/category/nutrition for ideas).    Hypertension recommendations:  DASH diet.  Decrease sodium intake to <1,500 mg/day.  Recommend weight loss efforts (see www.[email or web address]/category/nutrition for ideas).  Recommend exercising (brisk walking, jogging, swimming, bicycling) 30 minutes/day, 5 days/week.  Stop smoking (if applicable).  Call Tobacco Quit Line (8-994-QUIT-NOW) for resources and support.  Decrease alcohol intake (if applicable).    F/U 2-3 weeks: BP check.

## 2023-12-06 ENCOUNTER — APPOINTMENT (OUTPATIENT)
Dept: PRIMARY CARE | Facility: CLINIC | Age: 38
End: 2023-12-06
Payer: COMMERCIAL

## 2023-12-14 ENCOUNTER — OFFICE VISIT (OUTPATIENT)
Dept: PRIMARY CARE | Facility: CLINIC | Age: 38
End: 2023-12-14
Payer: COMMERCIAL

## 2023-12-14 VITALS
OXYGEN SATURATION: 98 % | TEMPERATURE: 96.9 F | BODY MASS INDEX: 39.17 KG/M2 | HEIGHT: 75 IN | WEIGHT: 315 LBS | DIASTOLIC BLOOD PRESSURE: 82 MMHG | RESPIRATION RATE: 16 BRPM | HEART RATE: 75 BPM | SYSTOLIC BLOOD PRESSURE: 168 MMHG

## 2023-12-14 DIAGNOSIS — I10 PRIMARY HYPERTENSION: ICD-10-CM

## 2023-12-14 PROCEDURE — 3079F DIAST BP 80-89 MM HG: CPT | Performed by: FAMILY MEDICINE

## 2023-12-14 PROCEDURE — 3008F BODY MASS INDEX DOCD: CPT | Performed by: FAMILY MEDICINE

## 2023-12-14 PROCEDURE — 3077F SYST BP >= 140 MM HG: CPT | Performed by: FAMILY MEDICINE

## 2023-12-14 PROCEDURE — 1036F TOBACCO NON-USER: CPT | Performed by: FAMILY MEDICINE

## 2023-12-14 PROCEDURE — 3052F HG A1C>EQUAL 8.0%<EQUAL 9.0%: CPT | Performed by: FAMILY MEDICINE

## 2023-12-14 PROCEDURE — 99214 OFFICE O/P EST MOD 30 MIN: CPT | Performed by: FAMILY MEDICINE

## 2023-12-14 RX ORDER — AMLODIPINE BESYLATE 10 MG/1
10 TABLET ORAL DAILY
Qty: 90 TABLET | Refills: 0 | Status: SHIPPED | OUTPATIENT
Start: 2023-12-14 | End: 2024-02-22 | Stop reason: SDUPTHER

## 2023-12-14 NOTE — PROGRESS NOTES
"Subjective   Patient ID: Boy Ellington is a 38 y.o. male who presents for Hypertension.    HPI   H/O HTN.  BP elevated at last visit.  Here for BP check.  Did not take Norvasc today (ran out).   States he does not need refills on any other meds.    Review of Systems  No other complaints.     Objective   /82   Pulse 75   Temp 36.1 °C (96.9 °F)   Resp 16   Ht 1.905 m (6' 3\")   Wt 146 kg (321 lb 9.6 oz)   SpO2 98%   BMI 40.20 kg/m²     Physical Exam  Constitutional:       General: He is not in acute distress.     Appearance: He is morbidly obese.   Cardiovascular:      Rate and Rhythm: Normal rate and regular rhythm.      Heart sounds: Normal heart sounds. No murmur heard.     No friction rub. No gallop.   Pulmonary:      Effort: Pulmonary effort is normal.      Breath sounds: Normal breath sounds. No wheezing, rhonchi or rales.   Neurological:      Mental Status: He is oriented to person, place, and time.   Psychiatric:         Mood and Affect: Mood normal.         Behavior: Behavior normal.     Assessment/Plan   Diagnoses and all orders for this visit:  Primary hypertension  -     amLODIPine (Norvasc) 10 mg tablet; Take 1 tablet (10 mg) by mouth once daily.    Amlodipine refilled.    F/U 3 weeks: BP check.  "

## 2023-12-29 ENCOUNTER — TELEPHONE (OUTPATIENT)
Dept: PRIMARY CARE | Facility: CLINIC | Age: 38
End: 2023-12-29
Payer: COMMERCIAL

## 2023-12-29 DIAGNOSIS — I10 PRIMARY HYPERTENSION: ICD-10-CM

## 2023-12-29 RX ORDER — METOPROLOL SUCCINATE 100 MG/1
100 TABLET, EXTENDED RELEASE ORAL DAILY
Qty: 90 TABLET | Refills: 0 | Status: SHIPPED | OUTPATIENT
Start: 2023-12-29 | End: 2024-02-22 | Stop reason: SDUPTHER

## 2023-12-29 NOTE — TELEPHONE ENCOUNTER
Pt states he needs a refill on his metoprolol succinate. Last ov 12/14/23, next ov 1/11/2024. Thank you

## 2024-01-11 ENCOUNTER — OFFICE VISIT (OUTPATIENT)
Dept: PRIMARY CARE | Facility: CLINIC | Age: 39
End: 2024-01-11
Payer: COMMERCIAL

## 2024-01-11 VITALS
BODY MASS INDEX: 38.92 KG/M2 | WEIGHT: 313 LBS | OXYGEN SATURATION: 99 % | DIASTOLIC BLOOD PRESSURE: 84 MMHG | HEART RATE: 68 BPM | RESPIRATION RATE: 16 BRPM | TEMPERATURE: 97.7 F | HEIGHT: 75 IN | SYSTOLIC BLOOD PRESSURE: 172 MMHG

## 2024-01-11 DIAGNOSIS — R05.1 ACUTE COUGH: ICD-10-CM

## 2024-01-11 DIAGNOSIS — I10 ESSENTIAL HYPERTENSION: Primary | ICD-10-CM

## 2024-01-11 PROCEDURE — 3008F BODY MASS INDEX DOCD: CPT | Performed by: FAMILY MEDICINE

## 2024-01-11 PROCEDURE — 3079F DIAST BP 80-89 MM HG: CPT | Performed by: FAMILY MEDICINE

## 2024-01-11 PROCEDURE — 1036F TOBACCO NON-USER: CPT | Performed by: FAMILY MEDICINE

## 2024-01-11 PROCEDURE — 3077F SYST BP >= 140 MM HG: CPT | Performed by: FAMILY MEDICINE

## 2024-01-11 PROCEDURE — 99214 OFFICE O/P EST MOD 30 MIN: CPT | Performed by: FAMILY MEDICINE

## 2024-01-11 NOTE — PROGRESS NOTES
"Subjective   Patient ID: Boy Ellington is a 38 y.o. male who presents for Hypertension.    HPI   H/O HTN.  BP elevated at last visit (did not take Norvasc prior to visit).  Here for BP check, but did not take Maxzide today.  States he took Norvasc and Metoprolol today.  Does not need refills at this time.     Review of Systems  Reports occ cough over the last 2 wks.  Did not take home covid test.    Objective   /84   Pulse 68   Temp 36.5 °C (97.7 °F)   Resp 16   Ht 1.905 m (6' 3\")   Wt 142 kg (313 lb)   SpO2 99%   BMI 39.12 kg/m²     Physical Exam  Constitutional:       General: He is not in acute distress.     Appearance: He is obese.   Cardiovascular:      Rate and Rhythm: Normal rate and regular rhythm.      Heart sounds: Normal heart sounds. No murmur heard.     No friction rub. No gallop.   Pulmonary:      Effort: Pulmonary effort is normal.      Breath sounds: Normal breath sounds. No wheezing, rhonchi or rales.   Neurological:      General: No focal deficit present.      Mental Status: He is oriented to person, place, and time.   Psychiatric:         Mood and Affect: Mood normal.         Behavior: Behavior normal.     Assessment/Plan   Diagnoses and all orders for this visit:  Essential hypertension  Acute cough    Take all blood pressure meds as directed.    Recommend home covid test.  Return for evaluation of cough if covid test negative and cough persists.    F/U 3 weeks: BP check.   "

## 2024-01-11 NOTE — PATIENT INSTRUCTIONS
Take all blood pressure meds as directed.    Recommend home covid test.  Return for evaluation ofd cough if covid test negative and cough persists.    F/U 3 weeks: BP check.

## 2024-01-25 ENCOUNTER — APPOINTMENT (OUTPATIENT)
Dept: OPHTHALMOLOGY | Facility: CLINIC | Age: 39
End: 2024-01-25
Payer: COMMERCIAL

## 2024-02-01 ENCOUNTER — OFFICE VISIT (OUTPATIENT)
Dept: PRIMARY CARE | Facility: CLINIC | Age: 39
End: 2024-02-01
Payer: COMMERCIAL

## 2024-02-01 VITALS
OXYGEN SATURATION: 97 % | WEIGHT: 310.6 LBS | BODY MASS INDEX: 38.62 KG/M2 | SYSTOLIC BLOOD PRESSURE: 152 MMHG | HEART RATE: 73 BPM | HEIGHT: 75 IN | DIASTOLIC BLOOD PRESSURE: 70 MMHG | RESPIRATION RATE: 16 BRPM | TEMPERATURE: 98 F

## 2024-02-01 DIAGNOSIS — I10 PRIMARY HYPERTENSION: Primary | ICD-10-CM

## 2024-02-01 PROCEDURE — 99214 OFFICE O/P EST MOD 30 MIN: CPT | Performed by: FAMILY MEDICINE

## 2024-02-01 PROCEDURE — 3008F BODY MASS INDEX DOCD: CPT | Performed by: FAMILY MEDICINE

## 2024-02-01 PROCEDURE — 3078F DIAST BP <80 MM HG: CPT | Performed by: FAMILY MEDICINE

## 2024-02-01 PROCEDURE — 1036F TOBACCO NON-USER: CPT | Performed by: FAMILY MEDICINE

## 2024-02-01 PROCEDURE — 3077F SYST BP >= 140 MM HG: CPT | Performed by: FAMILY MEDICINE

## 2024-02-01 RX ORDER — CLONIDINE HYDROCHLORIDE 0.1 MG/1
0.1 TABLET ORAL 2 TIMES DAILY
Qty: 180 TABLET | Refills: 0 | Status: SHIPPED | OUTPATIENT
Start: 2024-02-01 | End: 2024-02-22 | Stop reason: DRUGHIGH

## 2024-02-01 NOTE — PATIENT INSTRUCTIONS
Start Clonidine (take as directed to avoid rebound hypertension).  Continue other current medications.    F/U 2-3 weeks: BP check.

## 2024-02-01 NOTE — PROGRESS NOTES
"Subjective   Patient ID: Boy Ellington is a 38 y.o. male who presents for Hypertension (Bp check follow up).    HPI   H/O HTN.  BP elevated at last visit (did not take Norvasc prior to that visit).  States he is taking meds as directed since last visit.  Here for BP check.  Does not need refills at this time.    Note:   Maxzide last Rxd 8/10/23, #90, no refills (should have ran about nearly 2 months ago).  States he still has this at home and does not need refill.    Review of Systems  No other complaints.     Objective   /70   Pulse 73   Temp 36.7 °C (98 °F)   Resp 16   Ht 1.905 m (6' 3\")   Wt 141 kg (310 lb 9.6 oz)   SpO2 97%   BMI 38.82 kg/m²     Physical Exam  Constitutional:       General: He is not in acute distress.     Appearance: He is obese.   Cardiovascular:      Rate and Rhythm: Normal rate and regular rhythm.      Heart sounds: Normal heart sounds. No murmur heard.     No friction rub. No gallop.   Pulmonary:      Effort: Pulmonary effort is normal.      Breath sounds: Normal breath sounds. No wheezing, rhonchi or rales.   Neurological:      Mental Status: He is oriented to person, place, and time.   Psychiatric:         Mood and Affect: Mood normal.         Behavior: Behavior normal.     Assessment/Plan   Diagnoses and all orders for this visit:  Primary hypertension  -     cloNIDine (Catapres) 0.1 mg tablet; Take 1 tablet (0.1 mg) by mouth 2 times a day.    Considered Hydralazine but worried about compliance w/QID dosing.  Start Clonidine (take as directed to avoid rebound hypertension).  Continue other current medications.    F/U 2-3 weeks: BP check.  "

## 2024-02-22 ENCOUNTER — OFFICE VISIT (OUTPATIENT)
Dept: PRIMARY CARE | Facility: CLINIC | Age: 39
End: 2024-02-22
Payer: COMMERCIAL

## 2024-02-22 VITALS
SYSTOLIC BLOOD PRESSURE: 142 MMHG | DIASTOLIC BLOOD PRESSURE: 74 MMHG | RESPIRATION RATE: 16 BRPM | BODY MASS INDEX: 38.42 KG/M2 | HEIGHT: 75 IN | OXYGEN SATURATION: 98 % | TEMPERATURE: 97.7 F | HEART RATE: 74 BPM | WEIGHT: 309 LBS

## 2024-02-22 DIAGNOSIS — I10 PRIMARY HYPERTENSION: Primary | ICD-10-CM

## 2024-02-22 PROCEDURE — 1036F TOBACCO NON-USER: CPT | Performed by: FAMILY MEDICINE

## 2024-02-22 PROCEDURE — 3077F SYST BP >= 140 MM HG: CPT | Performed by: FAMILY MEDICINE

## 2024-02-22 PROCEDURE — 3078F DIAST BP <80 MM HG: CPT | Performed by: FAMILY MEDICINE

## 2024-02-22 PROCEDURE — 99214 OFFICE O/P EST MOD 30 MIN: CPT | Performed by: FAMILY MEDICINE

## 2024-02-22 PROCEDURE — 3008F BODY MASS INDEX DOCD: CPT | Performed by: FAMILY MEDICINE

## 2024-02-22 RX ORDER — TRIAMTERENE/HYDROCHLOROTHIAZID 37.5-25 MG
1 TABLET ORAL DAILY
Qty: 90 TABLET | Refills: 0 | Status: SHIPPED | OUTPATIENT
Start: 2024-02-22

## 2024-02-22 RX ORDER — METOPROLOL SUCCINATE 100 MG/1
100 TABLET, EXTENDED RELEASE ORAL DAILY
Qty: 90 TABLET | Refills: 0 | Status: SHIPPED | OUTPATIENT
Start: 2024-02-22 | End: 2024-06-11

## 2024-02-22 RX ORDER — AMLODIPINE BESYLATE 10 MG/1
10 TABLET ORAL DAILY
Qty: 90 TABLET | Refills: 0 | Status: SHIPPED | OUTPATIENT
Start: 2024-02-22

## 2024-02-22 RX ORDER — CLONIDINE HYDROCHLORIDE 0.1 MG/1
0.2 TABLET ORAL 2 TIMES DAILY
Qty: 1 TABLET | Refills: 0 | Status: SHIPPED | OUTPATIENT
Start: 2024-02-22 | End: 2024-03-21 | Stop reason: SDUPTHER

## 2024-02-22 NOTE — PROGRESS NOTES
"Subjective   Patient ID: Boy Ellington is a 38 y.o. male who presents for Hypertension (Bp check follow up).    HPI  H/O HTN.  Clonidine started at last visit.  Here for BP check.  States he is taking med(s) as directed.  Requests refills (does not need Clonidine refilled).    Review of Systems  No other complaints.     Objective   /74   Pulse 74   Temp 36.5 °C (97.7 °F)   Resp 16   Ht 1.905 m (6' 3\")   Wt 140 kg (309 lb)   SpO2 98%   BMI 38.62 kg/m²     Physical Exam  Constitutional:       General: He is not in acute distress.     Appearance: He is obese.   Cardiovascular:      Rate and Rhythm: Normal rate and regular rhythm.      Heart sounds: Normal heart sounds. No murmur heard.     No friction rub. No gallop.   Pulmonary:      Effort: Pulmonary effort is normal.      Breath sounds: Normal breath sounds. No wheezing, rhonchi or rales.   Neurological:      Mental Status: He is oriented to person, place, and time.   Psychiatric:         Mood and Affect: Mood normal.         Behavior: Behavior normal.     Assessment/Plan   Diagnoses and all orders for this visit:  Primary hypertension  -     cloNIDine (Catapres) 0.1 mg tablet; Take 2 tablets (0.2 mg) by mouth 2 times a day.  -     amLODIPine (Norvasc) 10 mg tablet; Take 1 tablet (10 mg) by mouth once daily.  -     metoprolol succinate XL (Toprol-XL) 100 mg 24 hr tablet; Take 1 tablet (100 mg) by mouth once daily.  -     triamterene-hydrochlorothiazid (Maxzide-25) 37.5-25 mg tablet; Take 1 tablet by mouth once daily.    Increase Clonidine 0.1 mg tab to 2 tabs twice/day (total of 0.2 mg twice/day).  Refilled other blood pressure medications as requested.    F/U 2-3 weeks: BP check.   "

## 2024-02-22 NOTE — PATIENT INSTRUCTIONS
Increase Clonidine 0.1 mg tab to 2 tabs twice/day (total of 0.2 mg twice/day).  Refilled other blood pressure medications as requested.    F/U 2-3 weeks: BP check.

## 2024-03-07 ENCOUNTER — OFFICE VISIT (OUTPATIENT)
Dept: PRIMARY CARE | Facility: CLINIC | Age: 39
End: 2024-03-07
Payer: COMMERCIAL

## 2024-03-07 VITALS
HEIGHT: 75 IN | RESPIRATION RATE: 16 BRPM | OXYGEN SATURATION: 97 % | SYSTOLIC BLOOD PRESSURE: 160 MMHG | BODY MASS INDEX: 38.67 KG/M2 | TEMPERATURE: 97.8 F | HEART RATE: 76 BPM | WEIGHT: 311 LBS | DIASTOLIC BLOOD PRESSURE: 90 MMHG

## 2024-03-07 DIAGNOSIS — I10 PRIMARY HYPERTENSION: Primary | ICD-10-CM

## 2024-03-07 PROCEDURE — 3077F SYST BP >= 140 MM HG: CPT | Performed by: FAMILY MEDICINE

## 2024-03-07 PROCEDURE — 3008F BODY MASS INDEX DOCD: CPT | Performed by: FAMILY MEDICINE

## 2024-03-07 PROCEDURE — 1036F TOBACCO NON-USER: CPT | Performed by: FAMILY MEDICINE

## 2024-03-07 PROCEDURE — 99214 OFFICE O/P EST MOD 30 MIN: CPT | Performed by: FAMILY MEDICINE

## 2024-03-07 PROCEDURE — 3080F DIAST BP >= 90 MM HG: CPT | Performed by: FAMILY MEDICINE

## 2024-03-07 NOTE — PROGRESS NOTES
"Subjective   Patient ID: Boy Ellington is a 38 y.o. male who presents for Hypertension (Bp check ).    HPI  H/O HTN.  Advised patient to increase Clonidine to 2 tabs BID at last visit.    Patient did not increase Clonidine as advised.  Here for BP check.  Does not need refills at this time.    Review of Systems  No other complaints.     Objective   /90   Pulse 76   Temp 36.6 °C (97.8 °F)   Resp 16   Ht 1.905 m (6' 3\")   Wt 141 kg (311 lb)   SpO2 97%   BMI 38.87 kg/m²     Physical Exam  Constitutional:       General: He is not in acute distress.     Appearance: He is obese.   Neurological:      Mental Status: He is oriented to person, place, and time.   Psychiatric:         Mood and Affect: Mood normal.         Behavior: Behavior normal.     Assessment/Plan   Diagnoses and all orders for this visit:  Primary hypertension    Increase Clonidine 0.1 mg tab to 2 tabs twice/day (total of 0.2 mg twice/day).  Continue other blood pressure medications at current dose.     F/U 1-2 weeks: BP check.   "

## 2024-03-07 NOTE — PATIENT INSTRUCTIONS
Increase Clonidine 0.1 mg tab to 2 tabs twice/day (total of 0.2 mg twice/day).  Continue other blood pressure medications at current dose.     F/U 1-2 weeks: BP check.

## 2024-03-14 ENCOUNTER — APPOINTMENT (OUTPATIENT)
Dept: OPHTHALMOLOGY | Facility: CLINIC | Age: 39
End: 2024-03-14
Payer: COMMERCIAL

## 2024-03-21 ENCOUNTER — OFFICE VISIT (OUTPATIENT)
Dept: PRIMARY CARE | Facility: CLINIC | Age: 39
End: 2024-03-21
Payer: COMMERCIAL

## 2024-03-21 VITALS
OXYGEN SATURATION: 98 % | SYSTOLIC BLOOD PRESSURE: 148 MMHG | HEIGHT: 75 IN | BODY MASS INDEX: 38.67 KG/M2 | DIASTOLIC BLOOD PRESSURE: 82 MMHG | TEMPERATURE: 97.8 F | WEIGHT: 311 LBS | RESPIRATION RATE: 16 BRPM | HEART RATE: 79 BPM

## 2024-03-21 DIAGNOSIS — E11.9 TYPE 2 DIABETES MELLITUS WITHOUT COMPLICATION, WITHOUT LONG-TERM CURRENT USE OF INSULIN (MULTI): ICD-10-CM

## 2024-03-21 DIAGNOSIS — I10 PRIMARY HYPERTENSION: Primary | ICD-10-CM

## 2024-03-21 PROCEDURE — 3079F DIAST BP 80-89 MM HG: CPT | Performed by: FAMILY MEDICINE

## 2024-03-21 PROCEDURE — 3008F BODY MASS INDEX DOCD: CPT | Performed by: FAMILY MEDICINE

## 2024-03-21 PROCEDURE — 3077F SYST BP >= 140 MM HG: CPT | Performed by: FAMILY MEDICINE

## 2024-03-21 PROCEDURE — 1036F TOBACCO NON-USER: CPT | Performed by: FAMILY MEDICINE

## 2024-03-21 PROCEDURE — 99214 OFFICE O/P EST MOD 30 MIN: CPT | Performed by: FAMILY MEDICINE

## 2024-03-21 RX ORDER — CLONIDINE HYDROCHLORIDE 0.3 MG/1
0.3 TABLET ORAL 2 TIMES DAILY
Qty: 180 TABLET | Refills: 0 | Status: SHIPPED | OUTPATIENT
Start: 2024-03-21

## 2024-03-21 NOTE — PATIENT INSTRUCTIONS
Increase Clonidine (prescription sent, take 1 tab twice per day).  Continue all other current medications.    Fasting labs previously ordered.  Will refill Metformin after reviewing results.    F/U 2-3 weeks: BP check.    Lab services: [address]  Hours: M-F 6:30a-6p, Sat 8a-12p  Phone: [phone], [address]

## 2024-03-21 NOTE — PROGRESS NOTES
"Subjective   Patient ID: Boy Ellington is a 38 y.o. male who presents for Blood Pressure Check.    HPI   H/O HTN.  Advised to increase Clonidine at last visit.  Here for BP check.  Taking med(s) as directed.  Requests refills (Clonidine only).    H/O DM.  Taking Metformin as directed.  Requests refills.    Did not have labs drawn as previously ordered.    Review of Systems  No other complaints.     Objective   /82   Pulse 79   Temp 36.6 °C (97.8 °F)   Resp 16   Ht 1.905 m (6' 3\")   Wt 141 kg (311 lb)   SpO2 98%   BMI 38.87 kg/m²     Physical Exam  Constitutional:       General: He is not in acute distress.     Appearance: He is obese.   Cardiovascular:      Rate and Rhythm: Normal rate and regular rhythm.      Heart sounds: Normal heart sounds. No murmur heard.     No friction rub. No gallop.   Pulmonary:      Effort: Pulmonary effort is normal.      Breath sounds: Normal breath sounds. No wheezing, rhonchi or rales.   Neurological:      Mental Status: He is oriented to person, place, and time.   Psychiatric:         Mood and Affect: Mood normal.         Behavior: Behavior normal.     Assessment/Plan   Diagnoses and all orders for this visit:  Primary hypertension  -     cloNIDine (Catapres) 0.3 mg tablet; Take 1 tablet (0.3 mg) by mouth 2 times a day.  Type 2 diabetes mellitus without complication, without long-term current use of insulin (CMS/[facility])    Increase Clonidine (prescription sent, take 1 tab twice per day).  Continue all other current medications.    Fasting labs previously ordered.  Will refill Metformin after reviewing results.    F/U 2-3 weeks: BP check.  "

## 2024-04-04 ENCOUNTER — APPOINTMENT (OUTPATIENT)
Dept: PRIMARY CARE | Facility: CLINIC | Age: 39
End: 2024-04-04
Payer: COMMERCIAL

## 2024-04-11 ENCOUNTER — APPOINTMENT (OUTPATIENT)
Dept: OPHTHALMOLOGY | Facility: CLINIC | Age: 39
End: 2024-04-11
Payer: COMMERCIAL

## 2024-04-11 ENCOUNTER — APPOINTMENT (OUTPATIENT)
Dept: PRIMARY CARE | Facility: CLINIC | Age: 39
End: 2024-04-11
Payer: COMMERCIAL

## 2024-04-18 ENCOUNTER — OFFICE VISIT (OUTPATIENT)
Dept: PRIMARY CARE | Facility: CLINIC | Age: 39
End: 2024-04-18
Payer: COMMERCIAL

## 2024-04-18 ENCOUNTER — LAB (OUTPATIENT)
Dept: LAB | Facility: LAB | Age: 39
End: 2024-04-18
Payer: COMMERCIAL

## 2024-04-18 VITALS
HEIGHT: 75 IN | SYSTOLIC BLOOD PRESSURE: 136 MMHG | RESPIRATION RATE: 16 BRPM | BODY MASS INDEX: 36.93 KG/M2 | TEMPERATURE: 97.4 F | WEIGHT: 297 LBS | HEART RATE: 72 BPM | DIASTOLIC BLOOD PRESSURE: 82 MMHG | OXYGEN SATURATION: 98 %

## 2024-04-18 DIAGNOSIS — E78.5 DYSLIPIDEMIA: ICD-10-CM

## 2024-04-18 DIAGNOSIS — Z11.59 NEED FOR HEPATITIS C SCREENING TEST: ICD-10-CM

## 2024-04-18 DIAGNOSIS — E11.29 TYPE 2 DIABETES MELLITUS WITH MICROALBUMINURIA, WITHOUT LONG-TERM CURRENT USE OF INSULIN (MULTI): Primary | ICD-10-CM

## 2024-04-18 DIAGNOSIS — Z12.5 PROSTATE CANCER SCREENING: ICD-10-CM

## 2024-04-18 DIAGNOSIS — I10 PRIMARY HYPERTENSION: ICD-10-CM

## 2024-04-18 DIAGNOSIS — I10 ESSENTIAL HYPERTENSION: ICD-10-CM

## 2024-04-18 DIAGNOSIS — D64.9 ANEMIA, UNSPECIFIED TYPE: ICD-10-CM

## 2024-04-18 DIAGNOSIS — E11.9 TYPE 2 DIABETES MELLITUS WITHOUT COMPLICATION, WITHOUT LONG-TERM CURRENT USE OF INSULIN (MULTI): ICD-10-CM

## 2024-04-18 DIAGNOSIS — R80.9 TYPE 2 DIABETES MELLITUS WITH MICROALBUMINURIA, WITHOUT LONG-TERM CURRENT USE OF INSULIN (MULTI): Primary | ICD-10-CM

## 2024-04-18 LAB
ALT SERPL W P-5'-P-CCNC: 32 U/L (ref 10–52)
ANION GAP SERPL CALC-SCNC: 15 MMOL/L (ref 10–20)
AST SERPL W P-5'-P-CCNC: 17 U/L (ref 9–39)
BUN SERPL-MCNC: 15 MG/DL (ref 6–23)
CALCIUM SERPL-MCNC: 9.8 MG/DL (ref 8.6–10.6)
CHLORIDE SERPL-SCNC: 97 MMOL/L (ref 98–107)
CHOLEST SERPL-MCNC: 172 MG/DL (ref 0–199)
CHOLESTEROL/HDL RATIO: 3.7
CO2 SERPL-SCNC: 29 MMOL/L (ref 21–32)
CREAT SERPL-MCNC: 0.89 MG/DL (ref 0.5–1.3)
EGFRCR SERPLBLD CKD-EPI 2021: >90 ML/MIN/1.73M*2
ERYTHROCYTE [DISTWIDTH] IN BLOOD BY AUTOMATED COUNT: 12.1 % (ref 11.5–14.5)
EST. AVERAGE GLUCOSE BLD GHB EST-MCNC: 352 MG/DL
FERRITIN SERPL-MCNC: 116 NG/ML (ref 20–300)
FOLATE SERPL-MCNC: 12.1 NG/ML
GLUCOSE SERPL-MCNC: 287 MG/DL (ref 74–99)
HBA1C MFR BLD: 13.9 %
HCT VFR BLD AUTO: 43.9 % (ref 41–52)
HCV AB SER QL: NONREACTIVE
HDLC SERPL-MCNC: 46.8 MG/DL
HGB BLD-MCNC: 15.3 G/DL (ref 13.5–17.5)
IRON SATN MFR SERPL: 18 % (ref 25–45)
IRON SERPL-MCNC: 54 UG/DL (ref 35–150)
LDLC SERPL CALC-MCNC: 115 MG/DL
MCH RBC QN AUTO: 30 PG (ref 26–34)
MCHC RBC AUTO-ENTMCNC: 34.9 G/DL (ref 32–36)
MCV RBC AUTO: 86 FL (ref 80–100)
NON HDL CHOLESTEROL: 125 MG/DL (ref 0–149)
NRBC BLD-RTO: 0 /100 WBCS (ref 0–0)
PLATELET # BLD AUTO: 297 X10*3/UL (ref 150–450)
POTASSIUM SERPL-SCNC: 4 MMOL/L (ref 3.5–5.3)
PSA SERPL-MCNC: 0.2 NG/ML
RBC # BLD AUTO: 5.1 X10*6/UL (ref 4.5–5.9)
SODIUM SERPL-SCNC: 137 MMOL/L (ref 136–145)
TIBC SERPL-MCNC: 305 UG/DL (ref 240–445)
TRANSFERRIN SERPL-MCNC: 218 MG/DL (ref 200–360)
TRIGL SERPL-MCNC: 50 MG/DL (ref 0–149)
UIBC SERPL-MCNC: 251 UG/DL (ref 110–370)
VIT B12 SERPL-MCNC: 409 PG/ML (ref 211–911)
VLDL: 10 MG/DL (ref 0–40)
WBC # BLD AUTO: 13.9 X10*3/UL (ref 4.4–11.3)

## 2024-04-18 PROCEDURE — 83540 ASSAY OF IRON: CPT

## 2024-04-18 PROCEDURE — 84153 ASSAY OF PSA TOTAL: CPT

## 2024-04-18 PROCEDURE — 3008F BODY MASS INDEX DOCD: CPT | Performed by: FAMILY MEDICINE

## 2024-04-18 PROCEDURE — 80048 BASIC METABOLIC PNL TOTAL CA: CPT

## 2024-04-18 PROCEDURE — 36415 COLL VENOUS BLD VENIPUNCTURE: CPT

## 2024-04-18 PROCEDURE — 82607 VITAMIN B-12: CPT

## 2024-04-18 PROCEDURE — 80061 LIPID PANEL: CPT

## 2024-04-18 PROCEDURE — 86803 HEPATITIS C AB TEST: CPT

## 2024-04-18 PROCEDURE — 99214 OFFICE O/P EST MOD 30 MIN: CPT | Performed by: FAMILY MEDICINE

## 2024-04-18 PROCEDURE — 3075F SYST BP GE 130 - 139MM HG: CPT | Performed by: FAMILY MEDICINE

## 2024-04-18 PROCEDURE — 82746 ASSAY OF FOLIC ACID SERUM: CPT

## 2024-04-18 PROCEDURE — 83036 HEMOGLOBIN GLYCOSYLATED A1C: CPT

## 2024-04-18 PROCEDURE — 3079F DIAST BP 80-89 MM HG: CPT | Performed by: FAMILY MEDICINE

## 2024-04-18 PROCEDURE — 85027 COMPLETE CBC AUTOMATED: CPT

## 2024-04-18 PROCEDURE — 3049F LDL-C 100-129 MG/DL: CPT | Performed by: FAMILY MEDICINE

## 2024-04-18 PROCEDURE — 84450 TRANSFERASE (AST) (SGOT): CPT

## 2024-04-18 PROCEDURE — 84466 ASSAY OF TRANSFERRIN: CPT

## 2024-04-18 PROCEDURE — 83550 IRON BINDING TEST: CPT

## 2024-04-18 PROCEDURE — 3046F HEMOGLOBIN A1C LEVEL >9.0%: CPT | Performed by: FAMILY MEDICINE

## 2024-04-18 PROCEDURE — 82728 ASSAY OF FERRITIN: CPT

## 2024-04-18 PROCEDURE — 84460 ALANINE AMINO (ALT) (SGPT): CPT

## 2024-04-18 RX ORDER — DAPAGLIFLOZIN 10 MG/1
10 TABLET, FILM COATED ORAL DAILY
Qty: 90 TABLET | Refills: 0 | Status: SHIPPED | OUTPATIENT
Start: 2024-04-18

## 2024-04-18 RX ORDER — METFORMIN HYDROCHLORIDE 500 MG/1
2000 TABLET, EXTENDED RELEASE ORAL
Qty: 360 TABLET | Refills: 0 | Status: SHIPPED | OUTPATIENT
Start: 2024-04-18

## 2024-04-18 RX ORDER — ATORVASTATIN CALCIUM 40 MG/1
40 TABLET, FILM COATED ORAL DAILY
Qty: 90 TABLET | Refills: 0 | Status: SHIPPED | OUTPATIENT
Start: 2024-04-18

## 2024-04-18 NOTE — PATIENT INSTRUCTIONS
Reviewed lab results.  Refilled Metformin.  Increased Atorvastatin.  Start Farxiga.  Continue blood pressure medications.    F/U 3 months: Labs, Med refills (call or send message through [employer] if med refills needed prior to visit).

## 2024-04-18 NOTE — PROGRESS NOTES
"Subjective   Patient ID: Boy Ellington is a 38 y.o. male who presents for Blood Pressure Check.    HPI   H/O HTN.    Clonidine increased at last visit.  Here for BP check.  Does not need refills.    H/O HLD.  States he's taking statin as advised (last Rx 8/10/23, #90 days, no refills, should have been out  x 5 months).  States he doesn't warner refills.    H/O DM.  States he's only been out of Metformin x few weeks (last Rx 8/13/23, #90 days, no refills, should have been out  x 5 months).  Requests refills.    Labs obtained prior to visit.   Of note: Currently taking Keflex for cellulitis right hand, which likely explains the current elevated WBC.    Review of Systems  No other complaints.     Objective   /82   Pulse 72   Temp 36.3 °C (97.4 °F)   Resp 16   Ht 1.905 m (6' 3\")   Wt 135 kg (297 lb)   SpO2 98%   BMI 37.12 kg/m²     Physical Exam  Constitutional:       General: He is not in acute distress.     Appearance: He is obese.   Cardiovascular:      Rate and Rhythm: Normal rate and regular rhythm.      Heart sounds: Normal heart sounds. No murmur heard.     No friction rub. No gallop.   Pulmonary:      Effort: Pulmonary effort is normal.      Breath sounds: Normal breath sounds. No wheezing, rhonchi or rales.   Neurological:      Mental Status: He is oriented to person, place, and time.   Psychiatric:         Mood and Affect: Mood normal.         Behavior: Behavior normal.     Assessment/Plan   Diagnoses and all orders for this visit:  Type 2 diabetes mellitus with microalbuminuria, without long-term current use of insulin (Multi)  -     metFORMIN  mg 24 hr tablet; Take 4 tablets (2,000 mg) by mouth once daily in the evening. Take with meals.  -     dapagliflozin propanediol (Farxiga) 10 mg; Take 1 tablet (10 mg) by mouth once daily.  Dyslipidemia  -     atorvastatin (Lipitor) 40 mg tablet; Take 1 tablet (40 mg) by mouth once daily.  Primary hypertension    Reviewed lab results.  Refilled " Metformin.  Increased Atorvastatin.  Start Farxiga.  Continue blood pressure medications.    F/U 3 months: Labs, Med refills (call or send message through [employer] if med refills needed prior to visit).   Attending Attestation (For Attendings USE Only)...

## 2024-06-06 DIAGNOSIS — I10 PRIMARY HYPERTENSION: ICD-10-CM

## 2024-06-11 RX ORDER — METOPROLOL SUCCINATE 100 MG/1
100 TABLET, EXTENDED RELEASE ORAL DAILY
Qty: 30 TABLET | Refills: 0 | Status: SHIPPED | OUTPATIENT
Start: 2024-06-11

## 2024-06-20 ENCOUNTER — APPOINTMENT (OUTPATIENT)
Dept: OPHTHALMOLOGY | Facility: CLINIC | Age: 39
End: 2024-06-20
Payer: COMMERCIAL

## 2024-07-18 ENCOUNTER — LAB (OUTPATIENT)
Dept: LAB | Facility: LAB | Age: 39
End: 2024-07-18
Payer: COMMERCIAL

## 2024-07-18 ENCOUNTER — APPOINTMENT (OUTPATIENT)
Dept: PRIMARY CARE | Facility: CLINIC | Age: 39
End: 2024-07-18
Payer: COMMERCIAL

## 2024-07-18 VITALS
BODY MASS INDEX: 37.3 KG/M2 | HEART RATE: 74 BPM | RESPIRATION RATE: 16 BRPM | HEIGHT: 75 IN | OXYGEN SATURATION: 99 % | DIASTOLIC BLOOD PRESSURE: 82 MMHG | WEIGHT: 300 LBS | SYSTOLIC BLOOD PRESSURE: 160 MMHG

## 2024-07-18 DIAGNOSIS — E78.5 DYSLIPIDEMIA: ICD-10-CM

## 2024-07-18 DIAGNOSIS — E11.9 TYPE 2 DIABETES MELLITUS WITHOUT COMPLICATION, WITHOUT LONG-TERM CURRENT USE OF INSULIN (MULTI): ICD-10-CM

## 2024-07-18 DIAGNOSIS — I10 PRIMARY HYPERTENSION: ICD-10-CM

## 2024-07-18 DIAGNOSIS — I10 PRIMARY HYPERTENSION: Primary | ICD-10-CM

## 2024-07-18 PROCEDURE — 99214 OFFICE O/P EST MOD 30 MIN: CPT | Performed by: FAMILY MEDICINE

## 2024-07-18 PROCEDURE — 36415 COLL VENOUS BLD VENIPUNCTURE: CPT

## 2024-07-18 PROCEDURE — 3079F DIAST BP 80-89 MM HG: CPT | Performed by: FAMILY MEDICINE

## 2024-07-18 PROCEDURE — 3008F BODY MASS INDEX DOCD: CPT | Performed by: FAMILY MEDICINE

## 2024-07-18 PROCEDURE — 3046F HEMOGLOBIN A1C LEVEL >9.0%: CPT | Performed by: FAMILY MEDICINE

## 2024-07-18 PROCEDURE — 83036 HEMOGLOBIN GLYCOSYLATED A1C: CPT

## 2024-07-18 PROCEDURE — 3049F LDL-C 100-129 MG/DL: CPT | Performed by: FAMILY MEDICINE

## 2024-07-18 PROCEDURE — 3077F SYST BP >= 140 MM HG: CPT | Performed by: FAMILY MEDICINE

## 2024-07-18 PROCEDURE — 1036F TOBACCO NON-USER: CPT | Performed by: FAMILY MEDICINE

## 2024-07-18 PROCEDURE — 80048 BASIC METABOLIC PNL TOTAL CA: CPT

## 2024-07-18 RX ORDER — CLONIDINE HYDROCHLORIDE 0.3 MG/1
0.3 TABLET ORAL 2 TIMES DAILY
Qty: 180 TABLET | Refills: 0 | Status: SHIPPED | OUTPATIENT
Start: 2024-07-18

## 2024-07-18 RX ORDER — ATORVASTATIN CALCIUM 40 MG/1
40 TABLET, FILM COATED ORAL DAILY
Qty: 90 TABLET | Refills: 0 | Status: SHIPPED | OUTPATIENT
Start: 2024-07-18

## 2024-07-18 RX ORDER — TRIAMTERENE/HYDROCHLOROTHIAZID 37.5-25 MG
1 TABLET ORAL DAILY
Qty: 90 TABLET | Refills: 0 | Status: SHIPPED | OUTPATIENT
Start: 2024-07-18

## 2024-07-18 RX ORDER — METOPROLOL SUCCINATE 100 MG/1
100 TABLET, EXTENDED RELEASE ORAL DAILY
Qty: 90 TABLET | Refills: 0 | Status: SHIPPED | OUTPATIENT
Start: 2024-07-18

## 2024-07-18 RX ORDER — AMLODIPINE BESYLATE 10 MG/1
10 TABLET ORAL DAILY
Qty: 90 TABLET | Refills: 0 | Status: SHIPPED | OUTPATIENT
Start: 2024-07-18

## 2024-07-18 NOTE — PROGRESS NOTES
"Subjective   Patient ID: Boy Ellington is a 39 y.o. male who presents for Med Refill.    HPI  Has DM, HLD, HTN.  Did not take Maxzide today (states he ran out).  States he is taking all other med(s) as directed.  Requests refills.    Review of Systems  No other complaints.     Objective   /82   Pulse 74   Resp 16   Ht 1.905 m (6' 3\")   Wt 136 kg (300 lb)   SpO2 99%   BMI 37.50 kg/m²     Physical Exam  Constitutional:       General: He is not in acute distress.     Appearance: He is obese.   Cardiovascular:      Rate and Rhythm: Normal rate and regular rhythm.      Heart sounds: Normal heart sounds. No murmur heard.     No friction rub. No gallop.   Pulmonary:      Effort: Pulmonary effort is normal.      Breath sounds: Normal breath sounds. No wheezing, rhonchi or rales.   Neurological:      Mental Status: He is oriented to person, place, and time.   Psychiatric:         Mood and Affect: Mood normal.         Behavior: Behavior normal.     Assessment/Plan   Diagnoses and all orders for this visit:  Primary hypertension  -     Basic Metabolic Panel; Future  -     amLODIPine (Norvasc) 10 mg tablet; Take 1 tablet (10 mg) by mouth once daily.  -     cloNIDine (Catapres) 0.3 mg tablet; Take 1 tablet (0.3 mg) by mouth 2 times a day.  -     metoprolol succinate XL (Toprol-XL) 100 mg 24 hr tablet; Take 1 tablet (100 mg) by mouth once daily.  -     triamterene-hydrochlorothiazid (Maxzide-25) 37.5-25 mg tablet; Take 1 tablet by mouth once daily.  Type 2 diabetes mellitus without complication, without long-term current use of insulin (Multi)  -     Hemoglobin A1C; Future  Dyslipidemia  -     atorvastatin (Lipitor) 40 mg tablet; Take 1 tablet (40 mg) by mouth once daily.    Nonfasting labs.  Refilled meds (except diabetes meds).  Will refill diabetes meds after reviewing lab results.    F/U 2-3 weeks: BP check (take all meds consistently prior to visit).  "

## 2024-07-18 NOTE — PATIENT INSTRUCTIONS
Nonfasting labs.  Refilled meds (except diabetes meds).  Will refill diabetes meds after reviewing lab results.    F/U 2-3 weeks: BP check (take all meds consistently prior to visit).    Lab services: [address]  Hours: M-F 6:30a-6p, Sat 8a-12p  Phone: [phone], [address]

## 2024-07-19 LAB
ANION GAP SERPL CALC-SCNC: 16 MMOL/L (ref 10–20)
BUN SERPL-MCNC: 14 MG/DL (ref 6–23)
CALCIUM SERPL-MCNC: 9.3 MG/DL (ref 8.6–10.6)
CHLORIDE SERPL-SCNC: 103 MMOL/L (ref 98–107)
CO2 SERPL-SCNC: 27 MMOL/L (ref 21–32)
CREAT SERPL-MCNC: 0.91 MG/DL (ref 0.5–1.3)
EGFRCR SERPLBLD CKD-EPI 2021: >90 ML/MIN/1.73M*2
EST. AVERAGE GLUCOSE BLD GHB EST-MCNC: 214 MG/DL
GLUCOSE SERPL-MCNC: 205 MG/DL (ref 74–99)
HBA1C MFR BLD: 9.1 %
POTASSIUM SERPL-SCNC: 3.6 MMOL/L (ref 3.5–5.3)
SODIUM SERPL-SCNC: 142 MMOL/L (ref 136–145)

## 2024-07-30 DIAGNOSIS — R80.9 TYPE 2 DIABETES MELLITUS WITH MICROALBUMINURIA, WITHOUT LONG-TERM CURRENT USE OF INSULIN (MULTI): ICD-10-CM

## 2024-07-30 DIAGNOSIS — E11.29 TYPE 2 DIABETES MELLITUS WITH MICROALBUMINURIA, WITHOUT LONG-TERM CURRENT USE OF INSULIN (MULTI): ICD-10-CM

## 2024-07-30 RX ORDER — GLIPIZIDE 5 MG/1
5 TABLET ORAL
Qty: 180 TABLET | Refills: 0 | Status: SHIPPED | OUTPATIENT
Start: 2024-07-30

## 2024-07-30 RX ORDER — DAPAGLIFLOZIN 10 MG/1
10 TABLET, FILM COATED ORAL DAILY
Qty: 90 TABLET | Refills: 0 | Status: SHIPPED | OUTPATIENT
Start: 2024-07-30

## 2024-07-30 RX ORDER — METFORMIN HYDROCHLORIDE 500 MG/1
2000 TABLET, EXTENDED RELEASE ORAL
Qty: 360 TABLET | Refills: 0 | Status: SHIPPED | OUTPATIENT
Start: 2024-07-30

## 2024-08-08 ENCOUNTER — APPOINTMENT (OUTPATIENT)
Dept: PRIMARY CARE | Facility: CLINIC | Age: 39
End: 2024-08-08
Payer: COMMERCIAL

## 2024-08-22 ENCOUNTER — APPOINTMENT (OUTPATIENT)
Dept: PRIMARY CARE | Facility: CLINIC | Age: 39
End: 2024-08-22
Payer: COMMERCIAL

## 2024-08-22 VITALS
HEART RATE: 65 BPM | HEIGHT: 75 IN | BODY MASS INDEX: 37.92 KG/M2 | DIASTOLIC BLOOD PRESSURE: 78 MMHG | SYSTOLIC BLOOD PRESSURE: 162 MMHG | RESPIRATION RATE: 16 BRPM | WEIGHT: 305 LBS | OXYGEN SATURATION: 98 %

## 2024-08-22 DIAGNOSIS — I10 PRIMARY HYPERTENSION: Primary | ICD-10-CM

## 2024-08-22 PROCEDURE — 3046F HEMOGLOBIN A1C LEVEL >9.0%: CPT | Performed by: FAMILY MEDICINE

## 2024-08-22 PROCEDURE — 3077F SYST BP >= 140 MM HG: CPT | Performed by: FAMILY MEDICINE

## 2024-08-22 PROCEDURE — 3008F BODY MASS INDEX DOCD: CPT | Performed by: FAMILY MEDICINE

## 2024-08-22 PROCEDURE — 3078F DIAST BP <80 MM HG: CPT | Performed by: FAMILY MEDICINE

## 2024-08-22 PROCEDURE — 99213 OFFICE O/P EST LOW 20 MIN: CPT | Performed by: FAMILY MEDICINE

## 2024-08-22 PROCEDURE — 1036F TOBACCO NON-USER: CPT | Performed by: FAMILY MEDICINE

## 2024-08-22 PROCEDURE — 3049F LDL-C 100-129 MG/DL: CPT | Performed by: FAMILY MEDICINE

## 2024-08-22 NOTE — PATIENT INSTRUCTIONS
Blood pressure still elevated.  Referred to cardiology for blood pressure management as discussed.    F/U 3 months: Annual wellness visit.

## 2024-08-22 NOTE — PROGRESS NOTES
"Subjective   Patient ID: oBy Ellington is a 39 y.o. male who presents for Blood Pressure Check.    HPI   Has HTN.  /82 at last visit (did not take Maxzide that day).  Here for BP check.  States he is taking all meds as directed since last visit.  Does not need refills at this time.    Of note:   Currently on calcium channel blocker, alpha agonist, beta blocker, and diuretics.    Allergic to ARB (according to prior PCP note, Losartan caused chest pain).    Review of Systems  No other complaints.     Objective   /78   Pulse 65   Resp 16   Ht 1.905 m (6' 3\")   Wt 138 kg (305 lb)   SpO2 98%   BMI 38.12 kg/m²     Physical Exam  Constitutional:       General: He is not in acute distress.     Appearance: He is obese.   Cardiovascular:      Rate and Rhythm: Normal rate and regular rhythm.      Heart sounds: Normal heart sounds. No murmur heard.     No friction rub. No gallop.   Pulmonary:      Effort: Pulmonary effort is normal.      Breath sounds: Normal breath sounds. No wheezing, rhonchi or rales.   Neurological:      Mental Status: He is oriented to person, place, and time.   Psychiatric:         Mood and Affect: Mood normal.         Behavior: Behavior normal.     Assessment/Plan   Diagnoses and all orders for this visit:  Primary hypertension  -     Referral to Cardiology; Future    Blood pressure still elevated on multiple medications.  Referred to cardiology for blood pressure management as discussed.    F/U 3 months: Annual wellness visit.  "

## 2024-11-25 DIAGNOSIS — I10 PRIMARY HYPERTENSION: ICD-10-CM

## 2024-11-25 RX ORDER — METOPROLOL SUCCINATE 100 MG/1
100 TABLET, EXTENDED RELEASE ORAL DAILY
Qty: 14 TABLET | Refills: 0 | Status: SHIPPED | OUTPATIENT
Start: 2024-11-25

## 2024-12-05 ENCOUNTER — OFFICE VISIT (OUTPATIENT)
Dept: CARDIOLOGY | Facility: CLINIC | Age: 39
End: 2024-12-05
Payer: COMMERCIAL

## 2024-12-05 VITALS
WEIGHT: 307 LBS | DIASTOLIC BLOOD PRESSURE: 101 MMHG | SYSTOLIC BLOOD PRESSURE: 172 MMHG | BODY MASS INDEX: 38.17 KG/M2 | HEIGHT: 75 IN | HEART RATE: 71 BPM

## 2024-12-05 DIAGNOSIS — I1A.0 RESISTANT HYPERTENSION: Primary | ICD-10-CM

## 2024-12-05 DIAGNOSIS — I10 PRIMARY HYPERTENSION: ICD-10-CM

## 2024-12-05 DIAGNOSIS — E78.2 MIXED HYPERLIPIDEMIA: ICD-10-CM

## 2024-12-05 DIAGNOSIS — R94.31 ABNORMAL EKG: ICD-10-CM

## 2024-12-05 PROCEDURE — 3077F SYST BP >= 140 MM HG: CPT | Performed by: INTERNAL MEDICINE

## 2024-12-05 PROCEDURE — 3049F LDL-C 100-129 MG/DL: CPT | Performed by: INTERNAL MEDICINE

## 2024-12-05 PROCEDURE — 3046F HEMOGLOBIN A1C LEVEL >9.0%: CPT | Performed by: INTERNAL MEDICINE

## 2024-12-05 PROCEDURE — 99214 OFFICE O/P EST MOD 30 MIN: CPT | Performed by: INTERNAL MEDICINE

## 2024-12-05 PROCEDURE — 3080F DIAST BP >= 90 MM HG: CPT | Performed by: INTERNAL MEDICINE

## 2024-12-05 PROCEDURE — 3008F BODY MASS INDEX DOCD: CPT | Performed by: INTERNAL MEDICINE

## 2024-12-05 PROCEDURE — 99204 OFFICE O/P NEW MOD 45 MIN: CPT | Performed by: INTERNAL MEDICINE

## 2024-12-05 RX ORDER — SPIRONOLACTONE 50 MG/1
50 TABLET, FILM COATED ORAL DAILY
Qty: 30 TABLET | Refills: 11 | Status: SHIPPED | OUTPATIENT
Start: 2024-12-05 | End: 2025-12-05

## 2024-12-05 NOTE — PROGRESS NOTES
Chief Complaint:   Hypertension     History of Present Illness     Boy Ellington is a 39 y.o. male presenting with resistant HTN.  States BP controlled when he started meds 5 yrs ago then became difficult to control x 1 year.  No cardiac Hx.  States compliance with meds.  No EtOH.  No LEONARDO.  Very active at work.  Denies angina or LYNCH.  No Fhx of HTN or CAD.  Drinks Gatorade.       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), Iron deficiency (10/27/2020), Mixed hyperlipidemia (12/05/2024), Narcolepsy without cataplexy ([facility]) (09/28/2017), Other chest pain (11/28/2018), Other enthesopathy of unspecified foot and ankle (10/18/2019), Other shoulder lesions, left shoulder (12/04/2020), Other specified disorders of ear, bilateral (06/18/2019), Other specified soft tissue disorders (09/25/2018), Other symptoms and signs involving the nervous system (06/26/2019), Pain in left ankle and joints of left foot (11/19/2018), Pain in left foot (11/19/2018), Pain in left shoulder, Pain in left shoulder (10/09/2020), Pain in right ankle and joints of right foot (10/18/2019), Pain in unspecified ankle and joints of unspecified foot (10/24/2019), Personal history of diseases of the blood and blood-forming organs and certain disorders involving the immune mechanism (10/27/2020), Personal history of other diseases of the circulatory system, Personal history of other diseases of the digestive system (08/28/2013), Personal history of other diseases of the digestive system, Personal history of other diseases of the digestive system, Personal history of other diseases of the respiratory system (10/19/2019), Personal history of other endocrine, nutritional and metabolic disease (10/13/2022), Personal history of other endocrine, nutritional and metabolic disease  (11/19/2018), Personal history of other specified conditions (09/15/2022), Personal history of other specified conditions (09/15/2022), Personal history of other specified conditions (10/18/2019), Personal history of other specified conditions (10/18/2019), Personal history of other specified conditions (05/26/2015), Personal history of other specified conditions (02/09/2018), Personal history of peptic ulcer disease (10/19/2019), Pneumonia, unspecified organism (07/07/2015), Prediabetes (09/15/2022), Resistant hypertension (12/05/2024), Sprain of unspecified ligament of right ankle, initial encounter (09/27/2018), Strain of muscle and tendon of front wall of thorax, initial encounter (02/09/2018), and Unspecified sprain of right foot, initial encounter (09/27/2018).    Past Surgical History:  He has a past surgical history that includes Appendectomy (08/16/2013).      Social History:  He reports that he has been smoking cigarettes. He started smoking about 23 years ago. He has a 21.9 pack-year smoking history. He has never used smokeless tobacco. He reports that he does not currently use alcohol. He reports that he does not currently use drugs.    Family History:  Family History   Problem Relation Name Age of Onset    Prostate cancer Father      Crohn's disease Brother          twin    Diabetes Father's Brother          Allergies:  Banana    Outpatient Medications:  Current Outpatient Medications   Medication Instructions    albuterol 90 mcg/actuation inhaler Inhale. INHALE 1 TO 2 PUFFS EVERY 4 TO 6 HOURS AS NEEDED. If shortness of breath or wheezing    amLODIPine (NORVASC) 10 mg, oral, Daily    atorvastatin (LIPITOR) 40 mg, oral, Daily    cloNIDine (CATAPRES) 0.3 mg, oral, 2 times daily    dapagliflozin propanediol (FARXIGA) 10 mg, oral, Daily    glipiZIDE (GLUCOTROL) 5 mg, oral, 2 times daily before meals    metFORMIN XR (GLUCOPHAGE-XR) 2,000 mg, oral, Daily with evening meal    metoprolol succinate XL  "(TOPROL-XL) 100 mg, oral, Daily    triamterene-hydrochlorothiazid (Maxzide-25) 37.5-25 mg tablet 1 tablet, oral, Daily       Physical Examination   Vitals:  Visit Vitals  BP (!) 172/101   Pulse 71   Ht 1.905 m (6' 3\")   Wt 139 kg (307 lb)   BMI 38.37 kg/m²   Smoking Status Every Day   BSA 2.71 m²    Physical Exam  Vitals reviewed.   Constitutional:       General: He is not in acute distress.     Appearance: Normal appearance.   HENT:      Head: Normocephalic and atraumatic.      Nose: Nose normal.   Eyes:      Conjunctiva/sclera: Conjunctivae normal.   Cardiovascular:      Rate and Rhythm: Normal rate and regular rhythm.      Pulses: Normal pulses.      Heart sounds: No murmur heard.  Pulmonary:      Effort: Pulmonary effort is normal. No respiratory distress.      Breath sounds: Normal breath sounds. No wheezing, rhonchi or rales.   Abdominal:      General: Bowel sounds are normal. There is no distension.      Palpations: Abdomen is soft.      Tenderness: There is no abdominal tenderness.   Musculoskeletal:         General: No swelling.      Right lower leg: No edema.      Left lower leg: No edema.   Skin:     General: Skin is warm and dry.      Capillary Refill: Capillary refill takes less than 2 seconds.   Neurological:      General: No focal deficit present.      Mental Status: He is alert.   Psychiatric:         Mood and Affect: Mood normal.             Labs/Imaging/Cardiac Studies     Last Labs:  CBC -  Lab Results   Component Value Date    WBC 13.9 (H) 04/18/2024    HGB 15.3 04/18/2024    HCT 43.9 04/18/2024    MCV 86 04/18/2024     04/18/2024       CMP -  Lab Results   Component Value Date    CALCIUM 9.3 07/18/2024    PROT 7.3 11/15/2021    ALBUMIN 4.1 11/15/2021    AST 17 04/18/2024    ALT 32 04/18/2024    ALKPHOS 151 (H) 11/15/2021    BILITOT 0.4 11/15/2021       LIPID PANEL -   Lab Results   Component Value Date    CHOL 172 04/18/2024    HDL 46.8 04/18/2024    CHHDL 3.7 04/18/2024    VLDL 10 " 04/18/2024    TRIG 50 04/18/2024    NHDL 125 04/18/2024       RENAL FUNCTION PANEL -   Lab Results   Component Value Date    K 3.6 07/18/2024       Lab Results   Component Value Date    BNP 6 10/03/2017    HGBA1C 9.1 (H) 07/18/2024       Reviewed ECG  Reviewed Labs  Reviewed ED notes    Echo:  No echocardiogram results found for the past 12 months       Assessment and Recommendations     Assessment/Plan   1. Resistant hypertension (Primary)  Will check renal ultrasound.  Change diuretic to spironolactone.  Stop Gatorade.  Home BP checks.     2. Abnormal ECG  Check Echo.    BMP 1 week.  Boy Ellington will return in 1 month for an office visit and Echo.            Demond Anne MD    Exclusive of any other services or procedures performed, I, Demond Anne MD , spent 30 minutes in duration for this visit today.  This time consisted of chart review, obtaining history, and/or performing the exam as documented above as well as documenting the clinical information for the encounter in the electronic record, discussing treatment options, plans, and/or goals with patient, family, and/or caregiver, refilling medications, updating the electronic record, ordering medicines, lab work, imaging, referrals, and/or procedures as documented above and communicating with other [facility] professionals. I have discussed the results of laboratory, radiology, and cardiology studies with the patient and their family/caregiver.

## 2024-12-12 ENCOUNTER — APPOINTMENT (OUTPATIENT)
Dept: PRIMARY CARE | Facility: CLINIC | Age: 39
End: 2024-12-12
Payer: COMMERCIAL

## 2024-12-12 VITALS
WEIGHT: 309 LBS | RESPIRATION RATE: 16 BRPM | BODY MASS INDEX: 38.42 KG/M2 | HEIGHT: 75 IN | DIASTOLIC BLOOD PRESSURE: 98 MMHG | SYSTOLIC BLOOD PRESSURE: 174 MMHG | OXYGEN SATURATION: 98 % | HEART RATE: 63 BPM

## 2024-12-12 DIAGNOSIS — Z00.00 ANNUAL PHYSICAL EXAM: Primary | ICD-10-CM

## 2024-12-12 DIAGNOSIS — R80.9 TYPE 2 DIABETES MELLITUS WITH MICROALBUMINURIA, WITHOUT LONG-TERM CURRENT USE OF INSULIN (MULTI): ICD-10-CM

## 2024-12-12 DIAGNOSIS — E78.5 DYSLIPIDEMIA: ICD-10-CM

## 2024-12-12 DIAGNOSIS — Z12.5 PROSTATE CANCER SCREENING: ICD-10-CM

## 2024-12-12 DIAGNOSIS — E66.812 CLASS 2 SEVERE OBESITY DUE TO EXCESS CALORIES WITH SERIOUS COMORBIDITY AND BODY MASS INDEX (BMI) OF 38.0 TO 38.9 IN ADULT: ICD-10-CM

## 2024-12-12 DIAGNOSIS — E66.01 CLASS 2 SEVERE OBESITY DUE TO EXCESS CALORIES WITH SERIOUS COMORBIDITY AND BODY MASS INDEX (BMI) OF 38.0 TO 38.9 IN ADULT: ICD-10-CM

## 2024-12-12 DIAGNOSIS — J84.9 ILD (INTERSTITIAL LUNG DISEASE) (MULTI): ICD-10-CM

## 2024-12-12 DIAGNOSIS — J44.9 CHRONIC OBSTRUCTIVE PULMONARY DISEASE, UNSPECIFIED COPD TYPE (MULTI): ICD-10-CM

## 2024-12-12 DIAGNOSIS — E11.29 TYPE 2 DIABETES MELLITUS WITH MICROALBUMINURIA, WITHOUT LONG-TERM CURRENT USE OF INSULIN (MULTI): ICD-10-CM

## 2024-12-12 DIAGNOSIS — J84.10 PULMONARY FIBROSIS (MULTI): ICD-10-CM

## 2024-12-12 DIAGNOSIS — I10 PRIMARY HYPERTENSION: ICD-10-CM

## 2024-12-12 PROCEDURE — 99214 OFFICE O/P EST MOD 30 MIN: CPT | Performed by: FAMILY MEDICINE

## 2024-12-12 PROCEDURE — 99396 PREV VISIT EST AGE 40-64: CPT | Performed by: FAMILY MEDICINE

## 2024-12-12 PROCEDURE — 3049F LDL-C 100-129 MG/DL: CPT | Performed by: FAMILY MEDICINE

## 2024-12-12 PROCEDURE — 3080F DIAST BP >= 90 MM HG: CPT | Performed by: FAMILY MEDICINE

## 2024-12-12 PROCEDURE — 3077F SYST BP >= 140 MM HG: CPT | Performed by: FAMILY MEDICINE

## 2024-12-12 PROCEDURE — 3046F HEMOGLOBIN A1C LEVEL >9.0%: CPT | Performed by: FAMILY MEDICINE

## 2024-12-12 PROCEDURE — 3008F BODY MASS INDEX DOCD: CPT | Performed by: FAMILY MEDICINE

## 2024-12-12 RX ORDER — METOPROLOL SUCCINATE 100 MG/1
100 TABLET, EXTENDED RELEASE ORAL DAILY
Qty: 30 TABLET | Refills: 0 | Status: SHIPPED | OUTPATIENT
Start: 2024-12-12

## 2024-12-12 RX ORDER — AMLODIPINE BESYLATE 10 MG/1
10 TABLET ORAL DAILY
Qty: 30 TABLET | Refills: 0 | Status: SHIPPED | OUTPATIENT
Start: 2024-12-12

## 2024-12-12 RX ORDER — CLONIDINE HYDROCHLORIDE 0.3 MG/1
0.3 TABLET ORAL 2 TIMES DAILY
Qty: 60 TABLET | Refills: 0 | Status: SHIPPED | OUTPATIENT
Start: 2024-12-12

## 2024-12-12 ASSESSMENT — ENCOUNTER SYMPTOMS
DEPRESSION: 0
LOSS OF SENSATION IN FEET: 0
OCCASIONAL FEELINGS OF UNSTEADINESS: 0

## 2024-12-12 NOTE — PROGRESS NOTES
"Subjective   Patient ID: Boy Ellington is a 39 y.o. male who presents for Annual Exam and Med Refill.    HPI   Patient's health is described as fair.  Regular dental visits: Yes.  Dental hygiene (brushing/flossing) regularly performed: Yes.  Corrective lenses: No.  Vision problems: Yes (occ blurry vision).  Last eye exam within 1 year: \"About a year ago.\"  Hearing loss: No.  Requests audiology referral: No.  Immunizations up to date: No (declined pcv20, flu).  Healthy diet: No.  Regular exercise: Yes.  Trying to lose weight: Yes.  Requests nutrition/weight loss referral: No.  Sexually active: Yes.  Using contraception: No.  Requests STD screening: No.  Colon cancer screening up to date: N/A.  Lung cancer screening up to date: N/A.  Hepatitis C screening up to date: Yes.    Has DM, HLD.  Out of Farxiga > 1  month.  States he has been taking Metformin and Glipizide despite the fact he should have been out > 1 month based on refill pattern.  Requests med refills.     Has HTN.  Current Tx per cardiology.  Current w/u in progress per cardiology.  States he took Spironolactone and Clonidine today.  Out of all other HTN meds x 2 days.  Requests refills of everything except Spironolactone.     Due for annual labs.  Already has order for BMP from cardiology.    Reviewed/Updated Active problem list, PMH, PSH, FH, SH, Meds, Allergies.    Review of Systems  No other complaints.     Objective   BP (!) 174/98   Pulse 63   Resp 16   Ht 1.905 m (6' 3\")   Wt 140 kg (309 lb)   SpO2 98%   BMI 38.62 kg/m²     Physical Exam  Constitutional:       General: He is not in acute distress.     Appearance: He is obese.   HENT:      Head: Normocephalic.      Right Ear: Tympanic membrane normal.      Left Ear: Tympanic membrane normal.      Mouth/Throat:      Pharynx: Oropharynx is clear. No oropharyngeal exudate or posterior oropharyngeal erythema.   Eyes:      Extraocular Movements: Extraocular movements intact.      Conjunctiva/sclera: " Conjunctivae normal.      Pupils: Pupils are equal, round, and reactive to light.   Neck:      Thyroid: No thyromegaly.      Vascular: No carotid bruit.   Cardiovascular:      Rate and Rhythm: Normal rate and regular rhythm.      Heart sounds: Normal heart sounds. No murmur heard.     No friction rub. No gallop.   Pulmonary:      Effort: Pulmonary effort is normal.      Breath sounds: Normal breath sounds. No wheezing, rhonchi or rales.   Abdominal:      General: Bowel sounds are normal. There is no distension.      Palpations: Abdomen is soft. There is no mass.      Tenderness: There is no abdominal tenderness. There is no guarding or rebound.   Genitourinary:     Comments: Declined prostate exam   Lymphadenopathy:      Cervical: No cervical adenopathy.   Skin:     Coloration: Skin is not jaundiced or pale.   Neurological:      General: No focal deficit present.      Mental Status: He is oriented to person, place, and time.   Psychiatric:         Mood and Affect: Mood normal.         Behavior: Behavior normal.     Assessment/Plan   Diagnoses and all orders for this visit:  Annual physical exam  Type 2 diabetes mellitus with microalbuminuria, without long-term current use of insulin (Multi)  -     Vitamin B12; Future  -     Albumin-Creatinine Ratio, Urine Random; Future  -     Hemoglobin A1C; Future  -     Referral to Ophthalmology; Future  -     Referral to Podiatry; Future  Dyslipidemia  -     Lipid Panel; Future  -     Aspartate Aminotransferase; Future  -     Alanine Aminotransferase; Future  Primary hypertension  -     Tx by cardiology  -     CBC; Future  -     amLODIPine (Norvasc) 10 mg tablet; Take 1 tablet (10 mg) by mouth once daily.  -     cloNIDine (Catapres) 0.3 mg tablet; Take 1 tablet (0.3 mg) by mouth 2 times a day.  -     metoprolol succinate XL (Toprol-XL) 100 mg 24 hr tablet; Take 1 tablet (100 mg) by mouth once daily.  ILD (interstitial lung disease) (Multi)        -     Tx by pulm  Chronic  obstructive pulmonary disease, unspecified COPD type (Multi)        -     Tx by pulm  Pulmonary fibrosis (Multi)        -     Tx by pulm  Prostate cancer screening  -     Prostate Specific Antigen, Screen; Future  Class 2 severe obesity due to excess calories with serious comorbidity and body mass index (BMI) of 38.0 to 38.9 in adult    Fasting labs.  Blood pressure medications refilled as requested (30 days).  Patient to contact cardiology for further refills as the are now managing his hypertension.  May consider GLP1 (ie Ozempic, etc) and pharmacy referral if HbA1c still above goal.  Will refill other medications after reviewing lab results.  Referred to ophthalmology (eye exam) and podiatry (foot exam).  Recommend weight loss efforts (see [email or web address] for ideas).  Keep appointments for ECHO, cardiology follow up, and renal artery ultrasound as scheduled.  Follow up with pulmonology as directed.    Discussed morbidity and mortality of uncontrolled hypertension including kidney failure, heart attack, stroke, and death.  Discussed the importance of taking blood pressure medication(s) as directed and the need to go to the ER if onset of cardiac or neurological symptoms.    F/U 6 months: Med refills.

## 2024-12-12 NOTE — PATIENT INSTRUCTIONS
Fasting labs.  Blood pressure medications refilled as requested (30 days).  Patient to contact cardiology for further refills as the are now managing his hypertension.  May consider GLP1 (ie Ozempic, etc) and pharmacy referral if HbA1c still above goal.  Will refill other medications after reviewing lab results.  Referred to ophthalmology (eye exam) and podiatry (foot exam).  Recommend weight loss efforts (see [email or web address] for ideas).  Keep appointments for ECHO, cardiology follow up, and renal artery ultrasound as scheduled.  Follow up with pulmonology as directed.    F/U 6 months: Med refills.    Lab services: [address]  Hours: M-F 6:30a-6p, Sat 8a-12p  Phone: [phone], Option 1

## 2024-12-19 ENCOUNTER — LAB (OUTPATIENT)
Dept: LAB | Facility: LAB | Age: 39
End: 2024-12-19
Payer: COMMERCIAL

## 2024-12-19 DIAGNOSIS — I1A.0 RESISTANT HYPERTENSION: ICD-10-CM

## 2024-12-19 DIAGNOSIS — E78.2 MIXED HYPERLIPIDEMIA: ICD-10-CM

## 2024-12-19 DIAGNOSIS — I10 PRIMARY HYPERTENSION: ICD-10-CM

## 2024-12-19 DIAGNOSIS — Z12.5 PROSTATE CANCER SCREENING: ICD-10-CM

## 2024-12-19 DIAGNOSIS — E11.29 TYPE 2 DIABETES MELLITUS WITH MICROALBUMINURIA, WITHOUT LONG-TERM CURRENT USE OF INSULIN (MULTI): ICD-10-CM

## 2024-12-19 DIAGNOSIS — R94.31 ABNORMAL EKG: ICD-10-CM

## 2024-12-19 DIAGNOSIS — R80.9 TYPE 2 DIABETES MELLITUS WITH MICROALBUMINURIA, WITHOUT LONG-TERM CURRENT USE OF INSULIN (MULTI): ICD-10-CM

## 2024-12-19 DIAGNOSIS — E78.5 DYSLIPIDEMIA: ICD-10-CM

## 2024-12-19 LAB
ALT SERPL W P-5'-P-CCNC: 33 U/L (ref 10–52)
ANION GAP SERPL CALC-SCNC: 13 MMOL/L (ref 10–20)
AST SERPL W P-5'-P-CCNC: 19 U/L (ref 9–39)
BUN SERPL-MCNC: 12 MG/DL (ref 6–23)
CALCIUM SERPL-MCNC: 9.6 MG/DL (ref 8.6–10.6)
CHLORIDE SERPL-SCNC: 100 MMOL/L (ref 98–107)
CHOLEST SERPL-MCNC: 155 MG/DL (ref 0–199)
CHOLESTEROL/HDL RATIO: 3.5
CO2 SERPL-SCNC: 28 MMOL/L (ref 21–32)
CREAT SERPL-MCNC: 0.86 MG/DL (ref 0.5–1.3)
CREAT UR-MCNC: 54.5 MG/DL (ref 20–370)
EGFRCR SERPLBLD CKD-EPI 2021: >90 ML/MIN/1.73M*2
ERYTHROCYTE [DISTWIDTH] IN BLOOD BY AUTOMATED COUNT: 12 % (ref 11.5–14.5)
EST. AVERAGE GLUCOSE BLD GHB EST-MCNC: 289 MG/DL
GLUCOSE SERPL-MCNC: 224 MG/DL (ref 74–99)
HBA1C MFR BLD: 11.7 %
HCT VFR BLD AUTO: 43.3 % (ref 41–52)
HDLC SERPL-MCNC: 44.4 MG/DL
HGB BLD-MCNC: 15 G/DL (ref 13.5–17.5)
LDLC SERPL CALC-MCNC: 93 MG/DL
MCH RBC QN AUTO: 29.9 PG (ref 26–34)
MCHC RBC AUTO-ENTMCNC: 34.6 G/DL (ref 32–36)
MCV RBC AUTO: 86 FL (ref 80–100)
MICROALBUMIN UR-MCNC: <7 MG/L
MICROALBUMIN/CREAT UR: NORMAL MG/G{CREAT}
NON HDL CHOLESTEROL: 111 MG/DL (ref 0–149)
NRBC BLD-RTO: 0 /100 WBCS (ref 0–0)
PLATELET # BLD AUTO: 268 X10*3/UL (ref 150–450)
POTASSIUM SERPL-SCNC: 4.2 MMOL/L (ref 3.5–5.3)
PSA SERPL-MCNC: 0.18 NG/ML
RBC # BLD AUTO: 5.02 X10*6/UL (ref 4.5–5.9)
SODIUM SERPL-SCNC: 137 MMOL/L (ref 136–145)
TRIGL SERPL-MCNC: 89 MG/DL (ref 0–149)
VIT B12 SERPL-MCNC: 450 PG/ML (ref 211–911)
VLDL: 18 MG/DL (ref 0–40)
WBC # BLD AUTO: 10.6 X10*3/UL (ref 4.4–11.3)

## 2024-12-19 PROCEDURE — 82043 UR ALBUMIN QUANTITATIVE: CPT

## 2024-12-19 PROCEDURE — 84450 TRANSFERASE (AST) (SGOT): CPT

## 2024-12-19 PROCEDURE — 83036 HEMOGLOBIN GLYCOSYLATED A1C: CPT

## 2024-12-19 PROCEDURE — 36415 COLL VENOUS BLD VENIPUNCTURE: CPT

## 2024-12-19 PROCEDURE — 80061 LIPID PANEL: CPT

## 2024-12-19 PROCEDURE — 85027 COMPLETE CBC AUTOMATED: CPT

## 2024-12-19 PROCEDURE — 80048 BASIC METABOLIC PNL TOTAL CA: CPT

## 2024-12-19 PROCEDURE — 82570 ASSAY OF URINE CREATININE: CPT

## 2024-12-19 PROCEDURE — 84153 ASSAY OF PSA TOTAL: CPT

## 2024-12-19 PROCEDURE — 82607 VITAMIN B-12: CPT

## 2024-12-19 PROCEDURE — 84460 ALANINE AMINO (ALT) (SGPT): CPT

## 2024-12-23 ENCOUNTER — TELEPHONE (OUTPATIENT)
Dept: CARDIOLOGY | Facility: CLINIC | Age: 39
End: 2024-12-23
Payer: COMMERCIAL

## 2024-12-23 NOTE — TELEPHONE ENCOUNTER
----- Message from Demond Anne sent at 12/23/2024 10:06 AM EST -----  BMP OK except for glucose.  Stay on same meds.  ----- Message -----  From: Lab, Background User  Sent: 12/19/2024   5:56 PM EST  To: Demond Anne MD

## 2025-01-06 ENCOUNTER — OFFICE VISIT (OUTPATIENT)
Dept: CARDIOLOGY | Facility: CLINIC | Age: 40
End: 2025-01-06
Payer: COMMERCIAL

## 2025-01-06 ENCOUNTER — APPOINTMENT (OUTPATIENT)
Dept: CARDIOLOGY | Facility: CLINIC | Age: 40
End: 2025-01-06
Payer: COMMERCIAL

## 2025-01-06 ENCOUNTER — HOSPITAL ENCOUNTER (OUTPATIENT)
Dept: CARDIOLOGY | Facility: CLINIC | Age: 40
Discharge: HOME | End: 2025-01-06
Payer: COMMERCIAL

## 2025-01-06 VITALS
HEIGHT: 75 IN | BODY MASS INDEX: 38.3 KG/M2 | WEIGHT: 308 LBS | SYSTOLIC BLOOD PRESSURE: 147 MMHG | HEART RATE: 66 BPM | DIASTOLIC BLOOD PRESSURE: 87 MMHG

## 2025-01-06 DIAGNOSIS — E78.2 MIXED HYPERLIPIDEMIA: ICD-10-CM

## 2025-01-06 DIAGNOSIS — R94.31 ABNORMAL EKG: ICD-10-CM

## 2025-01-06 DIAGNOSIS — I11.9 HYPERTENSIVE HEART DISEASE WITHOUT HEART FAILURE: ICD-10-CM

## 2025-01-06 DIAGNOSIS — I1A.0 RESISTANT HYPERTENSION: ICD-10-CM

## 2025-01-06 DIAGNOSIS — I1A.0 RESISTANT HYPERTENSION: Primary | ICD-10-CM

## 2025-01-06 DIAGNOSIS — I10 PRIMARY HYPERTENSION: ICD-10-CM

## 2025-01-06 LAB
AORTIC VALVE PEAK VELOCITY: 1.38 M/S
AV PEAK GRADIENT: 8 MMHG
AVA (PEAK VEL): 3.04 CM2
EJECTION FRACTION APICAL 4 CHAMBER: 57.6
EJECTION FRACTION: 66 %
LEFT ATRIUM VOLUME AREA LENGTH INDEX BSA: 34.9 ML/M2
LEFT VENTRICLE INTERNAL DIMENSION DIASTOLE: 5.05 CM (ref 3.5–6)
LEFT VENTRICULAR OUTFLOW TRACT DIAMETER: 2.12 CM
MITRAL VALVE E/A RATIO: 1.57
RIGHT VENTRICLE FREE WALL PEAK S': 14 CM/S
RIGHT VENTRICLE PEAK SYSTOLIC PRESSURE: 25.5 MMHG
TRICUSPID ANNULAR PLANE SYSTOLIC EXCURSION: 2.6 CM

## 2025-01-06 PROCEDURE — 99214 OFFICE O/P EST MOD 30 MIN: CPT | Performed by: INTERNAL MEDICINE

## 2025-01-06 PROCEDURE — 99214 OFFICE O/P EST MOD 30 MIN: CPT | Mod: 25 | Performed by: INTERNAL MEDICINE

## 2025-01-06 PROCEDURE — 3079F DIAST BP 80-89 MM HG: CPT | Performed by: INTERNAL MEDICINE

## 2025-01-06 PROCEDURE — 93306 TTE W/DOPPLER COMPLETE: CPT | Performed by: INTERNAL MEDICINE

## 2025-01-06 PROCEDURE — 3008F BODY MASS INDEX DOCD: CPT | Performed by: INTERNAL MEDICINE

## 2025-01-06 PROCEDURE — 93306 TTE W/DOPPLER COMPLETE: CPT

## 2025-01-06 PROCEDURE — 3077F SYST BP >= 140 MM HG: CPT | Performed by: INTERNAL MEDICINE

## 2025-01-06 RX ORDER — SPIRONOLACTONE 100 MG/1
100 TABLET, FILM COATED ORAL DAILY
Qty: 90 TABLET | Refills: 3 | Status: SHIPPED | OUTPATIENT
Start: 2025-01-06 | End: 2026-01-06

## 2025-01-06 NOTE — PROGRESS NOTES
Chief Complaint:   Hypertension     History of Present Illness     Boy Ellington is a 39 y.o. male presenting with resistant hypertension - BP better on aldactone.  No side effects.  Last PSG was 6-8 years ago. Denies angina or LYNCH.  Off Gatorade.  Follows low salt diet.  Exercising (work).  Ranout of 2 meds when at PCP last month.  Encouraged compliance.       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), Chest pain on breathing (01/07/2014), Glycosuria (09/15/2022), Iron deficiency (10/27/2020), Mixed hyperlipidemia (12/05/2024), Narcolepsy without cataplexy ([facility]) (09/28/2017), Other chest pain (11/28/2018), Other enthesopathy of unspecified foot and ankle (10/18/2019), Other shoulder lesions, left shoulder (12/04/2020), Other specified disorders of ear, bilateral (06/18/2019), Other specified soft tissue disorders (09/25/2018), Other symptoms and signs involving the nervous system (06/26/2019), Pain in left ankle and joints of left foot (11/19/2018), Pain in left foot (11/19/2018), Pain in left shoulder, Pain in left shoulder (10/09/2020), Pain in right ankle and joints of right foot (10/18/2019), Pain in unspecified ankle and joints of unspecified foot (10/24/2019), Personal history of diseases of the blood and blood-forming organs and certain disorders involving the immune mechanism (10/27/2020), Personal history of other diseases of the circulatory system, Personal history of other diseases of the digestive system (08/28/2013), Personal history of other diseases of the digestive system, Personal history of other diseases of the digestive system, Personal history of other diseases of the respiratory system (10/19/2019), Personal history of other endocrine, nutritional and metabolic disease (10/13/2022), Personal history of other endocrine, nutritional and metabolic  disease (11/19/2018), Personal history of other specified conditions (09/15/2022), Personal history of other specified conditions (09/15/2022), Personal history of other specified conditions (10/18/2019), Personal history of other specified conditions (10/18/2019), Personal history of other specified conditions (05/26/2015), Personal history of other specified conditions (02/09/2018), Personal history of peptic ulcer disease (10/19/2019), Pneumonia, unspecified organism (07/07/2015), Prediabetes (09/15/2022), Resistant hypertension (12/05/2024), Sprain of unspecified ligament of right ankle, initial encounter (09/27/2018), Strain of muscle and tendon of front wall of thorax, initial encounter (02/09/2018), and Unspecified sprain of right foot, initial encounter (09/27/2018).    Past Surgical History:  He has a past surgical history that includes Appendectomy (08/16/2013).      Social History:  He reports that he has been smoking cigarettes. He started smoking about 24 years ago. He has a 22 pack-year smoking history. He has never used smokeless tobacco. He reports that he does not currently use alcohol. He reports that he does not currently use drugs.    Family History:  Family History   Problem Relation Name Age of Onset    Prostate cancer Father      Crohn's disease Brother          twin    Diabetes Father's Brother          Allergies:  Banana    Outpatient Medications:  Current Outpatient Medications   Medication Instructions    albuterol 90 mcg/actuation inhaler Inhale. INHALE 1 TO 2 PUFFS EVERY 4 TO 6 HOURS AS NEEDED. If shortness of breath or wheezing    amLODIPine (NORVASC) 10 mg, oral, Daily    atorvastatin (LIPITOR) 40 mg, oral, Daily    cloNIDine (CATAPRES) 0.3 mg, oral, 2 times daily    dapagliflozin propanediol (FARXIGA) 10 mg, oral, Daily    glipiZIDE (GLUCOTROL) 5 mg, oral, 2 times daily before meals    metFORMIN XR (GLUCOPHAGE-XR) 2,000 mg, oral, Daily with evening meal    metoprolol succinate XL  "(TOPROL-XL) 100 mg, oral, Daily    spironolactone (ALDACTONE) 50 mg, oral, Daily       Physical Examination   Vitals:  Visit Vitals  /87 (BP Location: Right arm, Patient Position: Sitting, BP Cuff Size: Large adult)   Pulse 66   Ht 1.905 m (6' 3\")   Wt 140 kg (308 lb)   BMI 38.50 kg/m²   Smoking Status Every Day   BSA 2.72 m²    Physical Exam  Vitals reviewed.   Constitutional:       General: He is not in acute distress.     Appearance: Normal appearance.   HENT:      Head: Normocephalic and atraumatic.      Nose: Nose normal.   Eyes:      Conjunctiva/sclera: Conjunctivae normal.   Cardiovascular:      Rate and Rhythm: Normal rate and regular rhythm.      Pulses: Normal pulses.      Heart sounds: No murmur heard.  Pulmonary:      Effort: Pulmonary effort is normal. No respiratory distress.      Breath sounds: Normal breath sounds. No wheezing, rhonchi or rales.   Abdominal:      General: Bowel sounds are normal. There is no distension.      Palpations: Abdomen is soft.      Tenderness: There is no abdominal tenderness.   Musculoskeletal:         General: No swelling.      Right lower leg: No edema.      Left lower leg: No edema.   Skin:     General: Skin is warm and dry.      Capillary Refill: Capillary refill takes less than 2 seconds.   Neurological:      General: No focal deficit present.      Mental Status: He is alert.   Psychiatric:         Mood and Affect: Mood normal.             Labs/Imaging/Cardiac Studies   I have personally reviewed the patient's available lab work, primary care appointment notes, pertinent imaging studies, and cardiac studies and have discussed them and my independent interpretation of those results with the patient and caregiver at this appointment.  All pertinent recent Emergency Department evaluations and Hospital admissions were also reviewed in detail with the patient and caregiver.      Reviewed Echo, PCP Notes and Labs  No echocardiogram results found for the past 12 months "       Assessment and Recommendations     Assessment/Plan       1. Resistant hypertension (Primary)  Increase Aldactone 100 mg every day and Labs in 1 week.  Check sleep study.  Echo no LVH.    2. Mixed hyperlipidemia  The patient's lipids are well controlled on chronic atorvastatin therapy and they are meeting their goal LDL cholesterol per the ACC/AHA guidelines.             Demond Anne MD    Exclusive of any other services or procedures performed, I, Demond Anne MD , spent 30 minutes in duration for this visit today.  This time consisted of chart review, obtaining history, and/or performing the exam as documented above as well as documenting the clinical information for the encounter in the electronic record, discussing treatment options, plans, and/or goals with patient, family, and/or caregiver, refilling medications, updating the electronic record, ordering medicines, lab work, imaging, referrals, and/or procedures as documented above and communicating with other [facility] professionals. I have discussed the results of laboratory, radiology, and cardiology studies with the patient and their family/caregiver.

## 2025-01-07 DIAGNOSIS — I10 PRIMARY HYPERTENSION: ICD-10-CM

## 2025-01-07 RX ORDER — CLONIDINE HYDROCHLORIDE 0.3 MG/1
0.3 TABLET ORAL 2 TIMES DAILY
Qty: 60 TABLET | Refills: 0 | OUTPATIENT
Start: 2025-01-07

## 2025-01-07 RX ORDER — AMLODIPINE BESYLATE 10 MG/1
10 TABLET ORAL DAILY
Qty: 30 TABLET | Refills: 0 | OUTPATIENT
Start: 2025-01-07

## 2025-01-12 DIAGNOSIS — E78.5 DYSLIPIDEMIA: ICD-10-CM

## 2025-01-12 DIAGNOSIS — R80.9 TYPE 2 DIABETES MELLITUS WITH MICROALBUMINURIA, WITHOUT LONG-TERM CURRENT USE OF INSULIN (MULTI): ICD-10-CM

## 2025-01-12 DIAGNOSIS — E11.29 TYPE 2 DIABETES MELLITUS WITH MICROALBUMINURIA, WITHOUT LONG-TERM CURRENT USE OF INSULIN (MULTI): ICD-10-CM

## 2025-01-12 DIAGNOSIS — I10 PRIMARY HYPERTENSION: ICD-10-CM

## 2025-01-12 RX ORDER — METFORMIN HYDROCHLORIDE 500 MG/1
2000 TABLET, EXTENDED RELEASE ORAL
Qty: 360 TABLET | Refills: 0 | Status: SHIPPED | OUTPATIENT
Start: 2025-01-12

## 2025-01-12 RX ORDER — GLIPIZIDE 5 MG/1
5 TABLET ORAL
Qty: 180 TABLET | Refills: 0 | Status: SHIPPED | OUTPATIENT
Start: 2025-01-12

## 2025-01-12 RX ORDER — ATORVASTATIN CALCIUM 40 MG/1
40 TABLET, FILM COATED ORAL DAILY
Qty: 90 TABLET | Refills: 1 | Status: SHIPPED | OUTPATIENT
Start: 2025-01-12

## 2025-01-12 RX ORDER — DAPAGLIFLOZIN 10 MG/1
10 TABLET, FILM COATED ORAL DAILY
Qty: 90 TABLET | Refills: 0 | Status: SHIPPED | OUTPATIENT
Start: 2025-01-12

## 2025-01-13 DIAGNOSIS — I10 PRIMARY HYPERTENSION: ICD-10-CM

## 2025-01-14 RX ORDER — AMLODIPINE BESYLATE 10 MG/1
10 TABLET ORAL DAILY
Qty: 90 TABLET | Refills: 3 | Status: SHIPPED | OUTPATIENT
Start: 2025-01-14

## 2025-01-16 ENCOUNTER — HOSPITAL ENCOUNTER (OUTPATIENT)
Dept: VASCULAR MEDICINE | Facility: CLINIC | Age: 40
Discharge: HOME | End: 2025-01-16
Payer: COMMERCIAL

## 2025-01-16 DIAGNOSIS — I10 PRIMARY HYPERTENSION: ICD-10-CM

## 2025-01-16 DIAGNOSIS — I1A.0 RESISTANT HYPERTENSION: ICD-10-CM

## 2025-01-16 DIAGNOSIS — R94.31 ABNORMAL EKG: ICD-10-CM

## 2025-01-16 DIAGNOSIS — E78.2 MIXED HYPERLIPIDEMIA: ICD-10-CM

## 2025-01-16 RX ORDER — METOPROLOL SUCCINATE 100 MG/1
100 TABLET, EXTENDED RELEASE ORAL DAILY
Qty: 90 TABLET | Refills: 3 | Status: SHIPPED | OUTPATIENT
Start: 2025-01-16

## 2025-01-16 RX ORDER — METOPROLOL SUCCINATE 100 MG/1
100 TABLET, EXTENDED RELEASE ORAL DAILY
Qty: 30 TABLET | Refills: 2 | OUTPATIENT
Start: 2025-01-16

## 2025-01-24 ENCOUNTER — TELEMEDICINE (OUTPATIENT)
Dept: PHARMACY | Facility: HOSPITAL | Age: 40
End: 2025-01-24
Payer: COMMERCIAL

## 2025-01-24 DIAGNOSIS — R80.9 TYPE 2 DIABETES MELLITUS WITH MICROALBUMINURIA, WITHOUT LONG-TERM CURRENT USE OF INSULIN (MULTI): ICD-10-CM

## 2025-01-24 DIAGNOSIS — E11.29 TYPE 2 DIABETES MELLITUS WITH MICROALBUMINURIA, WITHOUT LONG-TERM CURRENT USE OF INSULIN (MULTI): ICD-10-CM

## 2025-01-24 RX ORDER — IBUPROFEN 200 MG
CAPSULE ORAL
Qty: 100 EACH | Refills: 1 | Status: SHIPPED | OUTPATIENT
Start: 2025-01-24

## 2025-01-24 RX ORDER — LANCETS
EACH MISCELLANEOUS
Qty: 100 EACH | Refills: 3 | Status: SHIPPED | OUTPATIENT
Start: 2025-01-24

## 2025-01-24 RX ORDER — DEXTROSE 4 G
TABLET,CHEWABLE ORAL
Qty: 1 EACH | Refills: 0 | Status: SHIPPED | OUTPATIENT
Start: 2025-01-24

## 2025-01-24 NOTE — PROGRESS NOTES
Patient ID: Boy Ellington is a 39 y.o. male who presents for Diabetes. Patient was referred to pharmacy for Ozempic titration assistance.     Referring Provider: Oleksandr Flynn MD  PCP: Oleksandr Flynn MD Last visit with PCP: 12/12/24 Next visit with PCP: 6/12/25      Subjective   Treatment Adherence:   Patient did take medications today.   Number of missed doses in last 7 days: Patient misses his morning dose a few times a week      Preferred pharmacy: [facility] in [facility] patient afford prescribed medications: Yes, Ozempic is $25/month which is affordable for patient     Diabetes  He presents for his initial diabetic visit. He has type 2 diabetes mellitus. His disease course has been worsening. There are no hypoglycemic associated symptoms. He is compliant with treatment most of the time. He is following a generally unhealthy diet. When asked about meal planning, he reported none. He has had a previous visit with a dietitian.     Current DM Regimen  Ozempic 0.25 mg once weekly  Farxiga 10 mg once daily  Glipizide 5 mg twice daily  Metformin 500 XR: 2 tablets twice daily     Current weight: 308 lb     DISCUSSION  Patient has taken 1 dose of Ozempic 0.25 mg so far  Denies issues with administration   Denies side effects  Counseled on MOA, side effects, and dosing   Patient does not check BG at home  Agreeable to start checking  Counseled on FBG goal of  and 2 hour PPBG of <180  Patient states he is active at work where he is always on his feet however he does not engage in other physical activity  Patient believes his A1c has increased in the last 6 months due to his diet   Patient states willpower is his issue  He has met with a dietician in the past and does not feel that it would be helpful to schedule another appointment  Counseled on utilizing the Plate Method for healthy portions of vegetables/proteins/carbohydrates  Patient is willing to try    Patient Assistance Program Pre-screening:  completed  Patient does NOT qualify based on household income   Patient states his medications are affordable at this time   Patient has no further questions or concerns       Objective     There were no vitals taken for this visit.   BP Readings from Last 4 Encounters:   01/06/25 147/87   12/12/24 (!) 174/98   12/05/24 (!) 172/101   08/22/24 162/78      There were no vitals filed for this visit.     Labs  Lab Results   Component Value Date    BILITOT 0.4 11/15/2021    CALCIUM 9.6 12/19/2024    CO2 28 12/19/2024     12/19/2024    CREATININE 0.86 12/19/2024    GLUCOSE 224 (H) 12/19/2024    ALKPHOS 151 (H) 11/15/2021    K 4.2 12/19/2024    PROT 7.3 11/15/2021     12/19/2024    AST 19 12/19/2024    ALT 33 12/19/2024    BUN 12 12/19/2024    ANIONGAP 13 12/19/2024    MG 1.91 12/01/2018    ALBUMIN 4.1 11/15/2021    GFRMALE >90 08/10/2023     Lab Results   Component Value Date    TRIG 89 12/19/2024    CHOL 155 12/19/2024    LDLCALC 93 12/19/2024    HDL 44.4 12/19/2024     Lab Results   Component Value Date    HGBA1C 11.7 (H) 12/19/2024       Current Outpatient Medications   Medication Instructions    albuterol 90 mcg/actuation inhaler Inhale. INHALE 1 TO 2 PUFFS EVERY 4 TO 6 HOURS AS NEEDED. If shortness of breath or wheezing    amLODIPine (NORVASC) 10 mg, oral, Daily    atorvastatin (LIPITOR) 40 mg, oral, Daily    cloNIDine (CATAPRES) 0.3 mg, oral, 2 times daily    dapagliflozin propanediol (FARXIGA) 10 mg, oral, Daily    glipiZIDE (GLUCOTROL) 5 mg, oral, 2 times daily before meals    metFORMIN XR (GLUCOPHAGE-XR) 2,000 mg, oral, Daily with evening meal    metoprolol succinate XL (TOPROL-XL) 100 mg, oral, Daily    semaglutide 0.25 mg or 0.5 mg (2 mg/3 mL) pen injector Inject 0.25 mg under the skin every 7 days for 30 days, THEN 0.5 mg every 7 days.    spironolactone (ALDACTONE) 100 mg, oral, Daily       Assessment/Plan   Diabetes is uncontrolled with A1c of 11.7% on 12/19/24 (goal <7%)  CONTINUE Ozempic 0.25 mg  for 3 more weeks then INCREASE to 0.5 mg once weekly starting Saturday 2/15  CONTINUE metformin 1000 mg twice daily, farxiga 10 mg once daily, and glipizide 5 mg twice daily   START checking FBG at least a few times a week  START utilizing the Plate Method to increase vegetable consumption         Follow-up: 3/7 to assess tolerability to Ozempic 0.5 mg     Time spent with pt: Total length of time 25 (minutes) of the encounter and more than 50% was spent counseling the patient.    Marleny Figueroa, PharmD    Continue all meds under the continuation of care with the referring provider and clinical pharmacy team.

## 2025-02-12 RX ORDER — CLONIDINE HYDROCHLORIDE 0.3 MG/1
0.3 TABLET ORAL 2 TIMES DAILY
Qty: 60 TABLET | Refills: 0 | Status: SHIPPED | OUTPATIENT
Start: 2025-02-12

## 2025-02-21 ENCOUNTER — PATIENT MESSAGE (OUTPATIENT)
Dept: CARDIOLOGY | Facility: CLINIC | Age: 40
End: 2025-02-21
Payer: COMMERCIAL

## 2025-03-07 ENCOUNTER — APPOINTMENT (OUTPATIENT)
Dept: PHARMACY | Facility: HOSPITAL | Age: 40
End: 2025-03-07
Payer: COMMERCIAL

## 2025-03-07 DIAGNOSIS — E11.29 TYPE 2 DIABETES MELLITUS WITH MICROALBUMINURIA, WITHOUT LONG-TERM CURRENT USE OF INSULIN (MULTI): ICD-10-CM

## 2025-03-07 DIAGNOSIS — R80.9 TYPE 2 DIABETES MELLITUS WITH MICROALBUMINURIA, WITHOUT LONG-TERM CURRENT USE OF INSULIN (MULTI): ICD-10-CM

## 2025-03-07 RX ORDER — DAPAGLIFLOZIN 10 MG/1
10 TABLET, FILM COATED ORAL DAILY
Qty: 90 TABLET | Refills: 0 | Status: SHIPPED | OUTPATIENT
Start: 2025-03-07

## 2025-03-07 NOTE — PROGRESS NOTES
Patient ID: Boy Ellington is a 39 y.o. male who presents for Diabetes. At last visit, Ozempic was increased to 0.5 mg once weekly for further glycemic control.     Referring Provider: Oleksandr Flynn MD  PCP: Oleksandr Flynn MD Last visit with PCP: 12/12/24 Next visit with PCP: 6/12/25       Subjective   Treatment Adherence:   Patient did take medications today.   Number of missed doses in last 7 days: Patient misses his morning dose a few times a week      Preferred pharmacy: [facility] in [place]   Can patient afford prescribed medications: Yes, Ozempic is $25/month which is affordable for patient      Diabetes  He presents for his initial diabetic visit. He has type 2 diabetes mellitus. His disease course has been worsening. There are no hypoglycemic associated symptoms. He is compliant with treatment most of the time. He is following a generally unhealthy diet. When asked about meal planning, he reported none. He has had a previous visit with a dietitian.      Current DM Regimen  Ozempic 0.5 mg once weekly  Farxiga 10 mg once daily  Glipizide 5 mg twice daily  Metformin 500 XR: 2 tablets twice daily      Current weight: 308 lb      DISCUSSION  Patient has been tolerating Ozempic 0.5 mg well  Denies side effects  No effects on appetite   Patient picked up BG test strips and lancets but not the meter  Patient to  meter from pharmacy  Reminded patient of FBG goal   Patient recalls feeling thirsty all the time and drinking lots of water at the time his last A1c was taken but states symptoms have reduced lately   Patient states he has cut out sugar in his diet and his wife has been cooking healthier meals lately  Reminded patient to use the Plate Method as well and to control carbohydrate consumption as well  Patient is ready to try next dose of Ozempic  Counseled on side effects  Patient has no further questions or concerns     Objective     There were no vitals taken for this visit.   BP Readings  from Last 4 Encounters:   01/06/25 147/87   12/12/24 (!) 174/98   12/05/24 (!) 172/101   08/22/24 162/78      There were no vitals filed for this visit.     Labs  Lab Results   Component Value Date    BILITOT 0.4 11/15/2021    CALCIUM 9.6 12/19/2024    CO2 28 12/19/2024     12/19/2024    CREATININE 0.86 12/19/2024    GLUCOSE 224 (H) 12/19/2024    ALKPHOS 151 (H) 11/15/2021    K 4.2 12/19/2024    PROT 7.3 11/15/2021     12/19/2024    AST 19 12/19/2024    ALT 33 12/19/2024    BUN 12 12/19/2024    ANIONGAP 13 12/19/2024    MG 1.91 12/01/2018    ALBUMIN 4.1 11/15/2021    GFRMALE >90 08/10/2023     Lab Results   Component Value Date    TRIG 89 12/19/2024    CHOL 155 12/19/2024    LDLCALC 93 12/19/2024    HDL 44.4 12/19/2024     Lab Results   Component Value Date    HGBA1C 11.7 (H) 12/19/2024       Current Outpatient Medications   Medication Instructions    albuterol 90 mcg/actuation inhaler Inhale. INHALE 1 TO 2 PUFFS EVERY 4 TO 6 HOURS AS NEEDED. If shortness of breath or wheezing    amLODIPine (NORVASC) 10 mg, oral, Daily    atorvastatin (LIPITOR) 40 mg, oral, Daily    blood sugar diagnostic (Blood Glucose Test) strip Check BG once daily    blood-glucose meter misc Check BG once daily    cloNIDine (CATAPRES) 0.3 mg, oral, 2 times daily    dapagliflozin propanediol (FARXIGA) 10 mg, oral, Daily    glipiZIDE (GLUCOTROL) 5 mg, oral, 2 times daily before meals    lancets misc Check to BG once daily    metFORMIN XR (GLUCOPHAGE-XR) 2,000 mg, oral, Daily with evening meal    metoprolol succinate XL (TOPROL-XL) 100 mg, oral, Daily    spironolactone (ALDACTONE) 100 mg, oral, Daily         Assessment/Plan   Diabetes is uncontrolled with A1c of 11.7% on 12/19/24 (goal <7%)  INCREASE to Ozempic 1 mg once weekly once current supply is finished   CONTINUE metformin 1000 mg twice daily, farxiga 10 mg once daily, and glipizide 5 mg twice daily   START checking FBG at least a few times a week  START utilizing the Plate  Method to increase vegetable consumption         Follow-up: 4/4 to assess tolerability to Ozempic 1 mg      Time spent with pt: Total length of time 15 (minutes) of the encounter and more than 50% was spent counseling the patient.    Marleny Figueroa, PharmD    Continue all meds under the continuation of care with the referring provider and clinical pharmacy team.

## 2025-04-04 ENCOUNTER — APPOINTMENT (OUTPATIENT)
Dept: PHARMACY | Facility: HOSPITAL | Age: 40
End: 2025-04-04
Payer: COMMERCIAL

## 2025-04-04 DIAGNOSIS — R80.9 TYPE 2 DIABETES MELLITUS WITH MICROALBUMINURIA, WITHOUT LONG-TERM CURRENT USE OF INSULIN (MULTI): ICD-10-CM

## 2025-04-04 DIAGNOSIS — E11.29 TYPE 2 DIABETES MELLITUS WITH MICROALBUMINURIA, WITHOUT LONG-TERM CURRENT USE OF INSULIN (MULTI): ICD-10-CM

## 2025-04-07 NOTE — PROGRESS NOTES
Patient ID: Boy Ellington is a 39 y.o. male who presents for Diabetes.    Referring Provider: Oleksandr Flynn MD  PCP: Oleksandr Flynn MD Last visit with PCP: 12/12/24 Next visit with PCP: 6/12/25       Subjective   Treatment Adherence:   Patient did take medications today.   Number of missed doses in last 7 days: 0     Preferred pharmacy: [facility] in [place]   Can patient afford prescribed medications: Yes, Ozempic is $25/month which is affordable for patient      Diabetes  He presents for his initial diabetic visit. He has type 2 diabetes mellitus. His disease course has been worsening. There are no hypoglycemic associated symptoms. He is compliant with treatment most of the time. He is following a generally unhealthy diet. When asked about meal planning, he reported none. He has had a previous visit with a dietitian.      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   BP Readings from Last 4 Encounters:   01/06/25 147/87   12/12/24 (!) 174/98   12/05/24 (!) 172/101    08/22/24 162/78      There were no vitals filed for this visit.     Labs  Lab Results   Component Value Date    BILITOT 0.4 11/15/2021    CALCIUM 9.6 12/19/2024    CO2 28 12/19/2024     12/19/2024    CREATININE 0.86 12/19/2024    GLUCOSE 224 (H) 12/19/2024    ALKPHOS 151 (H) 11/15/2021    K 4.2 12/19/2024    PROT 7.3 11/15/2021     12/19/2024    AST 19 12/19/2024    ALT 33 12/19/2024    BUN 12 12/19/2024    ANIONGAP 13 12/19/2024    MG 1.91 12/01/2018    ALBUMIN 4.1 11/15/2021    GFRMALE >90 08/10/2023     Lab Results   Component Value Date    TRIG 89 12/19/2024    CHOL 155 12/19/2024    LDLCALC 93 12/19/2024    HDL 44.4 12/19/2024     Lab Results   Component Value Date    HGBA1C 11.7 (H) 12/19/2024       Current Outpatient Medications   Medication Instructions    albuterol 90 mcg/actuation inhaler Inhale. INHALE 1 TO 2 PUFFS EVERY 4 TO 6 HOURS AS NEEDED. If shortness of breath or wheezing    amLODIPine (NORVASC) 10 mg, oral, Daily    atorvastatin (LIPITOR) 40 mg, oral, Daily    blood sugar diagnostic (Blood Glucose Test) strip Check BG once daily    blood-glucose meter misc Check BG once daily    cloNIDine (CATAPRES) 0.3 mg, oral, 2 times daily    dapagliflozin propanediol (FARXIGA) 10 mg, oral, Daily    glipiZIDE (GLUCOTROL) 5 mg, oral, 2 times daily before meals    lancets misc Check to BG once daily    metFORMIN XR (GLUCOPHAGE-XR) 2,000 mg, oral, Daily with evening meal    metoprolol succinate XL (TOPROL-XL) 100 mg, oral, Daily    semaglutide (OZEMPIC) 1 mg, subcutaneous, Weekly    spironolactone (ALDACTONE) 100 mg, oral, Daily      Assessment/Plan   Diabetes is uncontrolled with A1c of 11.7% on 12/19/24 (goal <7%)    CONTINUE Ozempic 1 mg once weekly, metformin 1000 mg twice daily, farxiga 10 mg once daily, and glipizide 5 mg twice daily   START checking FBG at least a few times a week  START utilizing the Plate Method to increase vegetable consumption         Follow-up: 4/18 to check on BG  readings      Time spent with pt: Total length of time 20 (minutes) of the encounter and more than 50% was spent counseling the patient.    Marleny Figueroa, PharmD    Continue all meds under the continuation of care with the referring provider and clinical pharmacy team.

## 2025-04-17 DIAGNOSIS — R80.9 TYPE 2 DIABETES MELLITUS WITH MICROALBUMINURIA, WITHOUT LONG-TERM CURRENT USE OF INSULIN (MULTI): ICD-10-CM

## 2025-04-17 DIAGNOSIS — E11.29 TYPE 2 DIABETES MELLITUS WITH MICROALBUMINURIA, WITHOUT LONG-TERM CURRENT USE OF INSULIN (MULTI): ICD-10-CM

## 2025-04-18 ENCOUNTER — APPOINTMENT (OUTPATIENT)
Dept: PHARMACY | Facility: HOSPITAL | Age: 40
End: 2025-04-18
Payer: COMMERCIAL

## 2025-04-18 DIAGNOSIS — R80.9 TYPE 2 DIABETES MELLITUS WITH MICROALBUMINURIA, WITHOUT LONG-TERM CURRENT USE OF INSULIN (MULTI): ICD-10-CM

## 2025-04-18 DIAGNOSIS — E11.29 TYPE 2 DIABETES MELLITUS WITH MICROALBUMINURIA, WITHOUT LONG-TERM CURRENT USE OF INSULIN (MULTI): ICD-10-CM

## 2025-04-18 RX ORDER — DEXTROSE 4 G
TABLET,CHEWABLE ORAL
Qty: 1 EACH | Refills: 0 | Status: SHIPPED | OUTPATIENT
Start: 2025-04-18

## 2025-05-06 DIAGNOSIS — E11.29 TYPE 2 DIABETES MELLITUS WITH MICROALBUMINURIA, WITHOUT LONG-TERM CURRENT USE OF INSULIN (MULTI): ICD-10-CM

## 2025-05-06 DIAGNOSIS — R80.9 TYPE 2 DIABETES MELLITUS WITH MICROALBUMINURIA, WITHOUT LONG-TERM CURRENT USE OF INSULIN (MULTI): ICD-10-CM

## 2025-05-09 ENCOUNTER — APPOINTMENT (OUTPATIENT)
Dept: PHARMACY | Facility: HOSPITAL | Age: 40
End: 2025-05-09
Payer: COMMERCIAL

## 2025-05-09 DIAGNOSIS — R80.9 TYPE 2 DIABETES MELLITUS WITH MICROALBUMINURIA, WITHOUT LONG-TERM CURRENT USE OF INSULIN (MULTI): ICD-10-CM

## 2025-05-09 DIAGNOSIS — E11.29 TYPE 2 DIABETES MELLITUS WITH MICROALBUMINURIA, WITHOUT LONG-TERM CURRENT USE OF INSULIN (MULTI): ICD-10-CM

## 2025-05-11 LAB
ANION GAP SERPL CALCULATED.4IONS-SCNC: 9 MMOL/L (CALC) (ref 7–17)
BUN SERPL-MCNC: 12 MG/DL (ref 7–25)
BUN/CREAT SERPL: NORMAL (CALC) (ref 6–22)
CALCIUM SERPL-MCNC: 8.9 MG/DL (ref 8.6–10.3)
CHLORIDE SERPL-SCNC: 106 MMOL/L (ref 98–110)
CO2 SERPL-SCNC: 25 MMOL/L (ref 20–32)
CREAT SERPL-MCNC: 0.9 MG/DL (ref 0.6–1.29)
EGFRCR SERPLBLD CKD-EPI 2021: 110 ML/MIN/1.73M2
EST. AVERAGE GLUCOSE BLD GHB EST-MCNC: 143 MG/DL
EST. AVERAGE GLUCOSE BLD GHB EST-SCNC: 7.9 MMOL/L
GLUCOSE SERPL-MCNC: 84 MG/DL (ref 65–99)
HBA1C MFR BLD: 6.6 %
POTASSIUM SERPL-SCNC: 4 MMOL/L (ref 3.5–5.3)
SODIUM SERPL-SCNC: 140 MMOL/L (ref 135–146)

## 2025-05-12 RX ORDER — SEMAGLUTIDE 1.34 MG/ML
1 INJECTION, SOLUTION SUBCUTANEOUS
Refills: 1 | OUTPATIENT
Start: 2025-05-12

## 2025-05-12 NOTE — PROGRESS NOTES
Clinical Pharmacy Appointment    Patient ID: Boy Ellington is a 39 y.o. male who presents for Diabetes.    Pt is here for Follow Up appointment.     Referring Provider: Oleksandr Flynn MD  PCP: Oleksandr Flynn MD Last visit with PCP: 12/12/24 Next visit with PCP: 6/12/25     Subjective   Treatment Adherence:   Patient did take medications today.   Number of missed doses in last 7 days: 0     Preferred pharmacy: [facility] in [place]   Can patient afford prescribed medications: Yes, Ozempic is $25/month which is affordable for patient      Diabetes  He presents for his initial diabetic visit. He has type 2 diabetes mellitus. His disease course has been worsening. There are no hypoglycemic associated symptoms. He is compliant with treatment most of the time. He is following a generally unhealthy diet. When asked about meal planning, he reported none. He has had a previous visit with a dietitian.      Current DM Regimen  Ozempic 1 mg once weekly  Farxiga 10 mg once daily  Glipizide 5 mg twice daily  Metformin 500 XR: 2 tablets twice daily      Last recorded weight: 308 lb      DISCUSSION  Patient finally picked up BG glucometer   FBG: low 100s  Denies signs of hypoglycemia  Patient has yet to go to lab for blood work  Helped sign patient up for appointment for Saturday 5/10 at 8:10 am through [facility]  Patient continues to tolerate Ozempic 1 mg well   Denies side effects  Patient has no further questions or concerns     Objective   Allergies[1]  Social History     Social History Narrative    Not on file      Medication Review  Current Outpatient Medications   Medication Instructions    albuterol 90 mcg/actuation inhaler Inhale. INHALE 1 TO 2 PUFFS EVERY 4 TO 6 HOURS AS NEEDED. If shortness of breath or wheezing    amLODIPine (NORVASC) 10 mg, oral, Daily    atorvastatin (LIPITOR) 40 mg, oral, Daily    blood sugar diagnostic (Blood Glucose Test) strip Check BG once daily    blood-glucose meter misc Check BG once daily     cloNIDine (CATAPRES) 0.3 mg, oral, 2 times daily    dapagliflozin propanediol (FARXIGA) 10 mg, oral, Daily    glipiZIDE (GLUCOTROL) 5 mg, oral, 2 times daily before meals    lancets misc Check to BG once daily    metFORMIN XR (GLUCOPHAGE-XR) 2,000 mg, oral, Daily with evening meal    metoprolol succinate XL (TOPROL-XL) 100 mg, oral, Daily    semaglutide (OZEMPIC) 1 mg, subcutaneous, Weekly    spironolactone (ALDACTONE) 100 mg, oral, Daily      Vitals  BP Readings from Last 2 Encounters:   01/06/25 147/87   12/12/24 (!) 174/98     BMI Readings from Last 1 Encounters:   01/06/25 38.50 kg/m²      Labs  A1C  Lab Results   Component Value Date    HGBA1C 6.6 (H) 05/10/2025    HGBA1C 11.7 (H) 12/19/2024    HGBA1C 9.1 (H) 07/18/2024     BMP  Lab Results   Component Value Date    CALCIUM 8.9 05/10/2025     05/10/2025    K 4.0 05/10/2025    CO2 25 05/10/2025     05/10/2025    BUN 12 05/10/2025    CREATININE 0.90 05/10/2025    EGFR 110 05/10/2025     LFTs  Lab Results   Component Value Date    ALT 33 12/19/2024    AST 19 12/19/2024    ALKPHOS 151 (H) 11/15/2021    BILITOT 0.4 11/15/2021     FLP  Lab Results   Component Value Date    TRIG 89 12/19/2024    CHOL 155 12/19/2024    LDLF 80 05/01/2023    LDLCALC 93 12/19/2024    HDL 44.4 12/19/2024     Urine Microalbumin  Lab Results   Component Value Date    MICROALBCREA  12/19/2024      Comment:      One or more analytes used in this calculation is outside of the analytical measurement range. Calculation cannot be performed.     Weight Management  Wt Readings from Last 3 Encounters:   01/06/25 140 kg (308 lb)   12/12/24 140 kg (309 lb)   12/05/24 139 kg (307 lb)      There is no height or weight on file to calculate BMI.     Assessment/Plan     Diabetes is uncontrolled with A1c of 11.7% on 12/19/24 (goal <7%)     CONTINUE Ozempic 1 mg once weekly, metformin 1000 mg twice daily, farxiga 10 mg once daily, and glipizide 5 mg twice daily   START checking FBG at least a  few times a week  Please stop by lab for A1c    Clinical Pharmacist follow-up: 1 week to discuss A1c results     Continue all meds under the continuation of care with the referring provider and clinical pharmacy team.             [1]   Allergies  Allergen Reactions    Banana Itching

## 2025-05-15 ENCOUNTER — APPOINTMENT (OUTPATIENT)
Dept: OPHTHALMOLOGY | Facility: CLINIC | Age: 40
End: 2025-05-15
Payer: COMMERCIAL

## 2025-05-16 ENCOUNTER — TELEMEDICINE (OUTPATIENT)
Dept: PHARMACY | Facility: HOSPITAL | Age: 40
End: 2025-05-16
Payer: COMMERCIAL

## 2025-05-16 DIAGNOSIS — E11.29 TYPE 2 DIABETES MELLITUS WITH MICROALBUMINURIA, WITHOUT LONG-TERM CURRENT USE OF INSULIN (MULTI): ICD-10-CM

## 2025-05-16 DIAGNOSIS — R80.9 TYPE 2 DIABETES MELLITUS WITH MICROALBUMINURIA, WITHOUT LONG-TERM CURRENT USE OF INSULIN (MULTI): ICD-10-CM

## 2025-05-16 RX ORDER — GLIPIZIDE 5 MG/1
5 TABLET ORAL
Qty: 180 TABLET | Refills: 0 | Status: SHIPPED | OUTPATIENT
Start: 2025-05-16

## 2025-05-16 RX ORDER — METFORMIN HYDROCHLORIDE 500 MG/1
2000 TABLET, EXTENDED RELEASE ORAL
Qty: 360 TABLET | Refills: 0 | Status: SHIPPED | OUTPATIENT
Start: 2025-05-16

## 2025-05-16 NOTE — PROGRESS NOTES
Clinical Pharmacy Appointment    Patient ID: Boy Ellington is a 39 y.o. male who presents for Diabetes.    Pt is here for Follow Up appointment.     Referring Provider: Oleksandr Flynn MD  PCP: Oleksandr Flynn MD Last visit with PCP: 12/12/24 Next visit with PCP: 6/12/25     Subjective   Treatment Adherence:   Patient did take medications today.   Number of missed doses in last 7 days: 0     Preferred pharmacy: [facility] in [place]   Can patient afford prescribed medications: Yes, Ozempic is $25/month which is affordable for patient      Diabetes  He presents for his initial diabetic visit. He has type 2 diabetes mellitus. His disease course has been worsening. There are no hypoglycemic associated symptoms. He is compliant with treatment most of the time. He is following a generally unhealthy diet. When asked about meal planning, he reported none. He has had a previous visit with a dietitian.      Current DM Regimen  Ozempic 1 mg once weekly  Farxiga 10 mg once daily  Glipizide 5 mg twice daily  Metformin 500 XR: 2 tablets twice daily      Last recorded weight: 308 lb   - Patient believes he is under 300 lb now but does not recall actual weight      DISCUSSION  Congratulated patient on big A1c improvement from 11.7% to 6.6%  Patient continues to tolerate Ozempic 1 mg but does endorse some GI upsets  Recommended to stay hydrated, can take loperamide OTC as needed, and to notice any trends or trigger foods   Patient is keeping up with his exercise   Patient had 1 instance of dizziness after taking glipizide 2 weeks ago  Patient reports not eating enough that day  Counseled on signs of hypoglycemia and how to manage with Rule of 15  Patient has no further questions or concerns     Objective   Allergies[1]  Social History     Social History Narrative    Not on file      Medication Review  Current Outpatient Medications   Medication Instructions    albuterol 90 mcg/actuation inhaler Inhale. INHALE 1 TO 2 PUFFS EVERY 4  TO 6 HOURS AS NEEDED. If shortness of breath or wheezing    amLODIPine (NORVASC) 10 mg, oral, Daily    atorvastatin (LIPITOR) 40 mg, oral, Daily    blood sugar diagnostic (Blood Glucose Test) strip Check BG once daily    blood-glucose meter misc Check BG once daily    cloNIDine (CATAPRES) 0.3 mg, oral, 2 times daily    dapagliflozin propanediol (FARXIGA) 10 mg, oral, Daily    glipiZIDE (GLUCOTROL) 5 mg, oral, 2 times daily before meals    lancets misc Check to BG once daily    metFORMIN XR (GLUCOPHAGE-XR) 2,000 mg, oral, Daily with evening meal    metoprolol succinate XL (TOPROL-XL) 100 mg, oral, Daily    semaglutide (OZEMPIC) 1 mg, subcutaneous, Weekly    spironolactone (ALDACTONE) 100 mg, oral, Daily      Vitals  BP Readings from Last 2 Encounters:   01/06/25 147/87   12/12/24 (!) 174/98     BMI Readings from Last 1 Encounters:   01/06/25 38.50 kg/m²      Labs  A1C  Lab Results   Component Value Date    HGBA1C 6.6 (H) 05/10/2025    HGBA1C 11.7 (H) 12/19/2024    HGBA1C 9.1 (H) 07/18/2024     BMP  Lab Results   Component Value Date    CALCIUM 8.9 05/10/2025     05/10/2025    K 4.0 05/10/2025    CO2 25 05/10/2025     05/10/2025    BUN 12 05/10/2025    CREATININE 0.90 05/10/2025    EGFR 110 05/10/2025     LFTs  Lab Results   Component Value Date    ALT 33 12/19/2024    AST 19 12/19/2024    ALKPHOS 151 (H) 11/15/2021    BILITOT 0.4 11/15/2021     FLP  Lab Results   Component Value Date    TRIG 89 12/19/2024    CHOL 155 12/19/2024    LDLF 80 05/01/2023    LDLCALC 93 12/19/2024    HDL 44.4 12/19/2024     Urine Microalbumin  Lab Results   Component Value Date    MICROALBCREA  12/19/2024      Comment:      One or more analytes used in this calculation is outside of the analytical measurement range. Calculation cannot be performed.     Weight Management  Wt Readings from Last 3 Encounters:   01/06/25 140 kg (308 lb)   12/12/24 140 kg (309 lb)   12/05/24 139 kg (307 lb)      There is no height or weight on file  to calculate BMI.     Assessment/Plan   Diabetes has greatly improved and now is under control with A1c of 6.6% (compared to 11.7%). Goal <7%  CONTINUE Ozempic 1 mg weekly, metformin 1000 mg twice daily, Farxiga 10 mg once daily, and glipizide 5 mg twice daily  FUTURE CONSIDERATION   Can consider decreasing dose of glipizide if signs of hypoglycemia occurs and or if Ozempic is further titrated to maximum dose of 2 mg once weekly       Clinical Pharmacist follow-up: 6/13    Continue all meds under the continuation of care with the referring provider and clinical pharmacy team.           [1]   Allergies  Allergen Reactions    Banana Itching

## 2025-05-23 ENCOUNTER — APPOINTMENT (OUTPATIENT)
Dept: PHARMACY | Facility: HOSPITAL | Age: 40
End: 2025-05-23
Payer: COMMERCIAL

## 2025-06-12 ENCOUNTER — APPOINTMENT (OUTPATIENT)
Dept: PRIMARY CARE | Facility: CLINIC | Age: 40
End: 2025-06-12
Payer: COMMERCIAL

## 2025-06-12 VITALS
WEIGHT: 283.6 LBS | SYSTOLIC BLOOD PRESSURE: 164 MMHG | HEIGHT: 75 IN | HEART RATE: 77 BPM | RESPIRATION RATE: 16 BRPM | OXYGEN SATURATION: 98 % | BODY MASS INDEX: 35.26 KG/M2 | DIASTOLIC BLOOD PRESSURE: 92 MMHG

## 2025-06-12 DIAGNOSIS — E11.29 TYPE 2 DIABETES MELLITUS WITH MICROALBUMINURIA, WITHOUT LONG-TERM CURRENT USE OF INSULIN (MULTI): ICD-10-CM

## 2025-06-12 DIAGNOSIS — I1A.0 RESISTANT HYPERTENSION: ICD-10-CM

## 2025-06-12 DIAGNOSIS — R80.9 TYPE 2 DIABETES MELLITUS WITH MICROALBUMINURIA, WITHOUT LONG-TERM CURRENT USE OF INSULIN (MULTI): ICD-10-CM

## 2025-06-12 DIAGNOSIS — E78.5 DYSLIPIDEMIA: ICD-10-CM

## 2025-06-12 PROCEDURE — 3008F BODY MASS INDEX DOCD: CPT | Performed by: FAMILY MEDICINE

## 2025-06-12 PROCEDURE — 3077F SYST BP >= 140 MM HG: CPT | Performed by: FAMILY MEDICINE

## 2025-06-12 PROCEDURE — 3080F DIAST BP >= 90 MM HG: CPT | Performed by: FAMILY MEDICINE

## 2025-06-12 PROCEDURE — 99214 OFFICE O/P EST MOD 30 MIN: CPT | Performed by: FAMILY MEDICINE

## 2025-06-12 RX ORDER — DAPAGLIFLOZIN 10 MG/1
10 TABLET, FILM COATED ORAL DAILY
Qty: 90 TABLET | Refills: 1 | Status: SHIPPED | OUTPATIENT
Start: 2025-06-12

## 2025-06-12 RX ORDER — ATORVASTATIN CALCIUM 40 MG/1
40 TABLET, FILM COATED ORAL DAILY
Qty: 90 TABLET | Refills: 1 | Status: SHIPPED | OUTPATIENT
Start: 2025-06-12

## 2025-06-12 NOTE — PROGRESS NOTES
"Subjective   Patient ID: Boy Ellington is a 40 y.o. male who presents for Med Refill.    HPI  Has DM, HLD.  Some diarrhea since starting Ozempic.  Tolerable, does not want to change med or dose at this time.  Otherwise, condition(s) stable.  Taking med(s) as directed.  Requests refills (except Glipizide and Metformin).  Last HbA1c 6.6 (last month).    Has HTN, Tx by cardiology.  Missed a few days of meds but states he took his meds today.  No cardio or neuro complaints.    Review of Systems  No other complaints.     Objective   BP (!) 164/92   Pulse 77   Resp 16   Ht 1.905 m (6' 3\")   Wt 129 kg (283 lb 9.6 oz)   SpO2 98%   BMI 35.45 kg/m²     Physical Exam  Constitutional:       General: He is not in acute distress.     Appearance: He is obese.   Cardiovascular:      Rate and Rhythm: Normal rate and regular rhythm.      Heart sounds: Normal heart sounds. No murmur heard.     No friction rub. No gallop.   Pulmonary:      Effort: Pulmonary effort is normal.      Breath sounds: Normal breath sounds. No wheezing, rhonchi or rales.   Neurological:      Mental Status: He is oriented to person, place, and time.   Psychiatric:         Mood and Affect: Mood normal.         Behavior: Behavior normal.     Assessment/Plan   Diagnoses and all orders for this visit:  Type 2 diabetes mellitus with microalbuminuria, without long-term current use of insulin (Multi)  -     dapagliflozin propanediol (Farxiga) 10 mg tablet; Take 1 tablet (10 mg) by mouth once daily.  -     semaglutide (OZEMPIC) 1 mg/dose (4 mg/3 mL) pen injector; Inject 1 mg under the skin 1 (one) time per week.  Dyslipidemia  -     atorvastatin (Lipitor) 40 mg tablet; Take 1 tablet (40 mg) by mouth once daily.  Resistant hypertension    Refilled meds (except Glipizide and Metformin).  Call or send message through [facility] when Glipizide and Metformin refills needed.  Follow up with clinical pharmacist for diabetes as directed.  Follow up with cardiology for blood " pressure management as directed.    F/U 6 months: Annual wellness visit.

## 2025-06-13 ENCOUNTER — APPOINTMENT (OUTPATIENT)
Dept: PHARMACY | Facility: HOSPITAL | Age: 40
End: 2025-06-13
Payer: COMMERCIAL

## 2025-06-13 DIAGNOSIS — R80.9 TYPE 2 DIABETES MELLITUS WITH MICROALBUMINURIA, WITHOUT LONG-TERM CURRENT USE OF INSULIN (MULTI): ICD-10-CM

## 2025-06-13 DIAGNOSIS — E11.29 TYPE 2 DIABETES MELLITUS WITH MICROALBUMINURIA, WITHOUT LONG-TERM CURRENT USE OF INSULIN (MULTI): ICD-10-CM

## 2025-06-16 NOTE — PROGRESS NOTES
Clinical Pharmacy Appointment    Patient ID: Boy Ellington is a 40 y.o. male who presents for Diabetes.    Pt is here for Follow Up appointment.     Referring Provider: Oleksandr Flynn MD  PCP: Oleksandr Flynn MD  Last visit with PCP: 25   Next visit with PCP: 25    Subjective   Treatment Adherence:   Patient did take medications today.   Number of missed doses in last 7 days: 0     Preferred pharmacy: [facility] in [place]   Can patient afford prescribed medications: Yes, Ozempic is $25/month which is affordable for patient      Diabetes  He presents for his initial diabetic visit. He has type 2 diabetes mellitus. His disease course has been worsening. There are no hypoglycemic associated symptoms. He is compliant with treatment most of the time. He is following a generally unhealthy diet. When asked about meal planning, he reported none. He has had a previous visit with a dietitian.      Current DM Regimen  Ozempic 1 mg once weekly  Farxiga 10 mg once daily  Glipizide 5 mg twice daily  Patient only taking 1 tablet 2-3 times a week (patient states PCP is aware)  Metformin 500 XR: 2 tablets twice daily      Last recorded weight: 308 lb   - Patient believes he is under 300 lb now but does not recall actual weight      DISCUSSION  Patient reports having GI upsets on Ozempic 1 mg but has not tried anything to help with symptoms  Recommended OTC anti-diarrheal such as loperamide   Patient to buy after work today to try   Denies signs of hypoglycemia  FB-90s  Patient has been non-adherent to glipizide due to lower FBG readings   Patient has no further questions or concerns     Objective   Allergies[1]  Social History     Social History Narrative    Not on file      Medication Review  Current Outpatient Medications   Medication Instructions    albuterol 90 mcg/actuation inhaler Inhale. INHALE 1 TO 2 PUFFS EVERY 4 TO 6 HOURS AS NEEDED. If shortness of breath or wheezing    amLODIPine (NORVASC) 10 mg, oral,  Daily    atorvastatin (LIPITOR) 40 mg, oral, Daily    blood sugar diagnostic (Blood Glucose Test) strip Check BG once daily    blood-glucose meter misc Check BG once daily    cloNIDine (CATAPRES) 0.3 mg, oral, 2 times daily    dapagliflozin propanediol (FARXIGA) 10 mg, oral, Daily    glipiZIDE (GLUCOTROL) 5 mg, oral, 2 times daily before meals    lancets misc Check to BG once daily    metFORMIN XR (GLUCOPHAGE-XR) 2,000 mg, oral, Daily with evening meal    metoprolol succinate XL (TOPROL-XL) 100 mg, oral, Daily    semaglutide (OZEMPIC) 1 mg, subcutaneous, Weekly    spironolactone (ALDACTONE) 100 mg, oral, Daily      Vitals  BP Readings from Last 2 Encounters:   06/12/25 (!) 164/92   01/06/25 147/87     BMI Readings from Last 1 Encounters:   06/12/25 35.45 kg/m²      Labs  A1C  Lab Results   Component Value Date    HGBA1C 6.6 (H) 05/10/2025    HGBA1C 11.7 (H) 12/19/2024    HGBA1C 9.1 (H) 07/18/2024     BMP  Lab Results   Component Value Date    CALCIUM 8.9 05/10/2025     05/10/2025    K 4.0 05/10/2025    CO2 25 05/10/2025     05/10/2025    BUN 12 05/10/2025    CREATININE 0.90 05/10/2025    EGFR 110 05/10/2025     LFTs  Lab Results   Component Value Date    ALT 33 12/19/2024    AST 19 12/19/2024    ALKPHOS 151 (H) 11/15/2021    BILITOT 0.4 11/15/2021     FLP  Lab Results   Component Value Date    TRIG 89 12/19/2024    CHOL 155 12/19/2024    LDLF 80 05/01/2023    LDLCALC 93 12/19/2024    HDL 44.4 12/19/2024     Urine Microalbumin  Lab Results   Component Value Date    MICROALBCREA  12/19/2024      Comment:      One or more analytes used in this calculation is outside of the analytical measurement range. Calculation cannot be performed.     Weight Management  Wt Readings from Last 3 Encounters:   06/12/25 129 kg (283 lb 9.6 oz)   01/06/25 140 kg (308 lb)   12/12/24 140 kg (309 lb)      There is no height or weight on file to calculate BMI.     Assessment/Plan   Diabetes has greatly improved and now is under  control with A1c of 6.6% (compared to 11.7%). Goal <7%  DISCONTINUE glipizide as patient is nonadherent and FBG has been well controlled in the 80-90s  CONTINUE Ozempic 1 mg weekly, metformin 1000 mg twice daily, Farxiga 10 mg once daily    Clinical Pharmacist follow-up: 7/11 to assess BG readings without taking glipizide     Continue all meds under the continuation of care with the referring provider and clinical pharmacy team.           [1]   Allergies  Allergen Reactions    Banana Itching

## 2025-07-10 ENCOUNTER — APPOINTMENT (OUTPATIENT)
Dept: CARDIOLOGY | Facility: CLINIC | Age: 40
End: 2025-07-10
Payer: COMMERCIAL

## 2025-07-11 ENCOUNTER — APPOINTMENT (OUTPATIENT)
Dept: PHARMACY | Facility: HOSPITAL | Age: 40
End: 2025-07-11
Payer: COMMERCIAL

## 2025-07-11 DIAGNOSIS — E11.29 TYPE 2 DIABETES MELLITUS WITH MICROALBUMINURIA, WITHOUT LONG-TERM CURRENT USE OF INSULIN (MULTI): ICD-10-CM

## 2025-07-11 DIAGNOSIS — R80.9 TYPE 2 DIABETES MELLITUS WITH MICROALBUMINURIA, WITHOUT LONG-TERM CURRENT USE OF INSULIN (MULTI): ICD-10-CM

## 2025-07-13 DIAGNOSIS — I10 PRIMARY HYPERTENSION: ICD-10-CM

## 2025-07-18 ENCOUNTER — APPOINTMENT (OUTPATIENT)
Dept: PHARMACY | Facility: HOSPITAL | Age: 40
End: 2025-07-18
Payer: COMMERCIAL

## 2025-08-14 ENCOUNTER — APPOINTMENT (OUTPATIENT)
Dept: CARDIOLOGY | Facility: CLINIC | Age: 40
End: 2025-08-14
Payer: COMMERCIAL

## 2025-08-15 ENCOUNTER — APPOINTMENT (OUTPATIENT)
Dept: PHARMACY | Facility: HOSPITAL | Age: 40
End: 2025-08-15
Payer: COMMERCIAL

## 2025-08-15 DIAGNOSIS — E11.29 TYPE 2 DIABETES MELLITUS WITH MICROALBUMINURIA, WITHOUT LONG-TERM CURRENT USE OF INSULIN (MULTI): ICD-10-CM

## 2025-08-15 DIAGNOSIS — R80.9 TYPE 2 DIABETES MELLITUS WITH MICROALBUMINURIA, WITHOUT LONG-TERM CURRENT USE OF INSULIN (MULTI): ICD-10-CM

## 2025-08-21 RX ORDER — CLONIDINE HYDROCHLORIDE 0.3 MG/1
0.3 TABLET ORAL 2 TIMES DAILY
Qty: 60 TABLET | Refills: 0 | OUTPATIENT
Start: 2025-08-21

## 2025-09-18 ENCOUNTER — APPOINTMENT (OUTPATIENT)
Dept: CARDIOLOGY | Facility: CLINIC | Age: 40
End: 2025-09-18
Payer: COMMERCIAL

## 2025-09-26 ENCOUNTER — APPOINTMENT (OUTPATIENT)
Dept: PHARMACY | Facility: HOSPITAL | Age: 40
End: 2025-09-26
Payer: COMMERCIAL

## 2025-12-11 ENCOUNTER — APPOINTMENT (OUTPATIENT)
Dept: PRIMARY CARE | Facility: CLINIC | Age: 40
End: 2025-12-11
Payer: COMMERCIAL